# Patient Record
Sex: FEMALE | Race: WHITE | Employment: UNEMPLOYED | URBAN - METROPOLITAN AREA
[De-identification: names, ages, dates, MRNs, and addresses within clinical notes are randomized per-mention and may not be internally consistent; named-entity substitution may affect disease eponyms.]

---

## 2021-03-09 ENCOUNTER — TELEPHONE (OUTPATIENT)
Dept: FAMILY MEDICINE CLINIC | Age: 46
End: 2021-03-09

## 2021-03-09 ENCOUNTER — TRANSCRIBE ORDER (OUTPATIENT)
Dept: INTERNAL MEDICINE CLINIC | Age: 46
End: 2021-03-09

## 2021-03-11 ENCOUNTER — OFFICE VISIT (OUTPATIENT)
Dept: FAMILY MEDICINE CLINIC | Age: 46
End: 2021-03-11
Payer: MEDICAID

## 2021-03-11 VITALS
HEART RATE: 104 BPM | OXYGEN SATURATION: 95 % | DIASTOLIC BLOOD PRESSURE: 60 MMHG | BODY MASS INDEX: 17.73 KG/M2 | HEIGHT: 68 IN | RESPIRATION RATE: 16 BRPM | TEMPERATURE: 98.7 F | SYSTOLIC BLOOD PRESSURE: 100 MMHG | WEIGHT: 117 LBS

## 2021-03-11 DIAGNOSIS — R06.83 LOUD SNORING: ICD-10-CM

## 2021-03-11 DIAGNOSIS — N63.10 LUMP OF RIGHT BREAST: ICD-10-CM

## 2021-03-11 DIAGNOSIS — E10.65 TYPE 1 DIABETES MELLITUS WITH HYPERGLYCEMIA (HCC): Primary | ICD-10-CM

## 2021-03-11 DIAGNOSIS — F32.A ANXIETY AND DEPRESSION: ICD-10-CM

## 2021-03-11 DIAGNOSIS — F41.9 ANXIETY AND DEPRESSION: ICD-10-CM

## 2021-03-11 PROCEDURE — 99214 OFFICE O/P EST MOD 30 MIN: CPT | Performed by: FAMILY MEDICINE

## 2021-03-11 RX ORDER — DULOXETIN HYDROCHLORIDE 60 MG/1
60 CAPSULE, DELAYED RELEASE ORAL DAILY
Qty: 90 CAP | Refills: 1 | Status: SHIPPED | OUTPATIENT
Start: 2021-03-11 | End: 2021-11-11 | Stop reason: ALTCHOICE

## 2021-03-11 RX ORDER — HYDROXYZINE PAMOATE 25 MG/1
25 CAPSULE ORAL
Qty: 45 CAP | Refills: 0 | Status: SHIPPED | OUTPATIENT
Start: 2021-03-11 | End: 2021-03-25

## 2021-03-11 NOTE — PROGRESS NOTES
1. Have you been to the ER, urgent care clinic since your last visit? Hospitalized since your last visit? No    2. Have you seen or consulted any other health care providers outside of the [address] [place] [address] since your last visit? Include any pap smears or colon screening.  No

## 2021-03-11 NOTE — PROGRESS NOTES
Glenard Curling is a 50 y.o. female who presents to the office today with the following:  Chief Complaint   Patient presents with    Medication Evaluation     hydroxizine    Diabetes    Breast Mass     Right       HPI  DM 1  Last HbA1C 11.6  On Lantus  BS  in am  In pm 200-250, once 360  Has not seen dietician yet  Has virtual apt soon  On Lantus 27 U and 24 at night  Not taking fast acting after dinner  Taking 2 U of Lispro at breakfast and Lunch  Adjusted diet    Waiting to see Card  Waiting for records    Depression and Anxiety  On Cymbalta and Hydroxyzine   Needs refills  A little better with meds  Doing ok for right now      Right breast mass, had biopsy done 2 y ago  Bigger now, tender sometimes  Last Mammogram 2019  Drinks coffee and tea    Gained a few lbs    Tob abuse  Starting Chantix next Monday  Has not started yet    Snoring badly  No apnea    Review of Systems   Constitutional: Negative for weight loss. Neurological: Positive for dizziness. Psychiatric/Behavioral: Positive for depression. Negative for hallucinations and suicidal ideas. The patient is nervous/anxious and has insomnia. See HPI. Past Medical History:   Diagnosis Date    Allergies     CAD (coronary artery disease)     DM (diabetes mellitus), type 1, uncontrolled ([facility] [address]     HTN (hypertension)     Hyperlipidemia     Neuropathy     Renal stone        Past Surgical History:   Procedure Laterality Date    HX APPENDECTOMY      ruptured    HX CORONARY ARTERY BYPASS GRAFT  10/2019       No Known Allergies    Current Outpatient Medications   Medication Sig    hydrOXYzine pamoate (VISTARIL) 25 mg capsule Take 1 Cap by mouth three (3) times daily as needed for Anxiety for up to 14 days.  DULoxetine (CYMBALTA) 60 mg capsule Take 1 Cap by mouth daily.  spironolactone (ALDACTONE) 25 mg tablet Take  by mouth daily.     insulin glargine (LANTUS,BASAGLAR) 100 unit/mL (3 mL) inpn 27 units sc in am and 24 units at dinner    insulin lispro (Admelog SoloStar U-100 Insulin) 100 unit/mL kwikpen 4 units before meals, and per sliding scale when over 150, max 20 U a day    glucose blood VI test strips (Glucocard Expression) strip Test Glucose tid for DM E11.9    varenicline (CHANTIX STARTER FADIA) 0.5 mg (11)- 1 mg (42) DsPk Follow instructions    lisinopriL (PRINIVIL, ZESTRIL) 2.5 mg tablet Take 1 Tab by mouth daily.  gabapentin (NEURONTIN) 600 mg tablet Take 600 mg by mouth three (3) times daily.  atorvastatin (LIPITOR) 80 mg tablet Take 80 mg by mouth daily.  ascorbic acid, vitamin C, (Vitamin C) 250 mg tablet Take  by mouth.  naproxen sodium (Aleve) 220 mg cap Take  by mouth as needed.  multivitamin (ONE A DAY) tablet Take 1 Tab by mouth daily.  loratadine (CLARITIN) 10 mg tablet Take 10 mg by mouth daily.  flash glucose sensor (FreeStyle Messi 14 Day Sensor) kit Check Glucose qid for DM 1    flash glucose scanning reader (FreeStyle Messi 14 Day [place]) misc Check Glucose qid for DM 1     No current facility-administered medications for this visit. Social History     Socioeconomic History    Marital status: SINGLE     Spouse name: Not on file    Number of children: Not on file    Years of education: Not on file    Highest education level: Not on file   Tobacco Use    Smoking status: Current Every Day Smoker     Types: Cigarettes   Substance and Sexual Activity    Alcohol use: Never     Frequency: Never     Binge frequency: Never    Drug use: Yes     Types: Marijuana     Comment: for appetite       No family history on file. Physical Exam:  Visit Vitals  /60   Pulse (!) 104   Temp 98.7 °F (37.1 °C)   Resp 16   Ht 5' 8\" (1.727 m)   Wt 117 lb (53.1 kg)   SpO2 95%   BMI 17.79 kg/m²     Physical Exam  Vitals signs and nursing note reviewed. Constitutional:       Appearance: She is normal weight. HENT:      Head: Normocephalic and atraumatic.    Eyes:      Extraocular Movements: Extraocular movements intact. Conjunctiva/sclera: Conjunctivae normal.   Cardiovascular:      Rate and Rhythm: Normal rate and regular rhythm. Heart sounds: Normal heart sounds. Pulmonary:      Effort: Pulmonary effort is normal.      Breath sounds: Normal breath sounds. Comments: Right breast with about 1 cm lump at 11 o'clock position  Chest:       Lymphadenopathy:      Cervical: No cervical adenopathy. Skin:     General: Skin is warm and dry. Neurological:      Mental Status: She is alert and oriented to person, place, and time. Psychiatric:         Mood and Affect: Mood normal.         Behavior: Behavior normal.         Assessment/Plan:    ICD-10-CM ICD-9-CM    1. Type 1 diabetes mellitus with hyperglycemia (HCC)  E10.65 250.01 Increase Lispro to 3 U with Lunch and monitor BS   2. Lump of right breast  N63.10 611.72 JESUSITA 3D BIRGIT W MAMMO RT DX INCL CAD   3. Anxiety and depression  F41.9 300.00 hydrOXYzine pamoate (VISTARIL) 25 mg capsule    F32.9 311 DULoxetine (CYMBALTA) 60 mg capsule   4. Loud snoring  R06.83 786.09 REFERRAL TO SLEEP STUDIES       Follow-up and Dispositions    · Return in about 2 months (around 5/11/2021).        For Ben Dalton MD

## 2021-03-16 ENCOUNTER — TELEPHONE (OUTPATIENT)
Dept: FAMILY MEDICINE CLINIC | Age: 46
End: 2021-03-16

## 2021-03-16 NOTE — TELEPHONE ENCOUNTER
I have called and left a detailed message on this patient's personalized VMB. We got a message reporting that she did not show up for her apt for her diabetes with Lidna Ahn RD, CDE (dietician) at [facility]. I have asked the patient to call Nathalia Terrell at phone # [phone] and reschedule her apt. I have also asked the patient to call the office and let us know what she is planning on doing about this apt, whether she is going to do it or not.

## 2021-04-15 ENCOUNTER — TELEPHONE (OUTPATIENT)
Dept: FAMILY MEDICINE CLINIC | Age: 46
End: 2021-04-15

## 2021-04-15 NOTE — TELEPHONE ENCOUNTER
Patient has been to the ER twice now because her sugars keep dropping. She is asking for Dr. Cameron Corral to call her. Patient was offered an appointment but she would like to talk to Dr. Cameron Corral first prior to her coming in. Please call her.  [phone]

## 2021-04-16 ENCOUNTER — TELEPHONE (OUTPATIENT)
Dept: FAMILY MEDICINE CLINIC | Age: 46
End: 2021-04-16

## 2021-04-16 NOTE — TELEPHONE ENCOUNTER
I have called and left a detailed message on patient's personalized VMB. She needs an apt with Dr Sloane Yusuf to discuss her hypoglycemic episodes. Also, bring a diary of her BS readings from home.

## 2021-04-16 NOTE — TELEPHONE ENCOUNTER
I have called and left a detailed message on patient's personalized VMB. She needs an apt with Dr Gladis North to discuss her hypoglycemic episodes. Also, bring a diary of her BS readings from home.

## 2021-04-19 ENCOUNTER — OFFICE VISIT (OUTPATIENT)
Dept: FAMILY MEDICINE CLINIC | Age: 46
End: 2021-04-19
Payer: MEDICAID

## 2021-04-19 ENCOUNTER — TELEPHONE (OUTPATIENT)
Dept: FAMILY MEDICINE CLINIC | Age: 46
End: 2021-04-19

## 2021-04-19 VITALS
SYSTOLIC BLOOD PRESSURE: 106 MMHG | HEIGHT: 68 IN | OXYGEN SATURATION: 98 % | HEART RATE: 60 BPM | TEMPERATURE: 98 F | RESPIRATION RATE: 14 BRPM | WEIGHT: 116 LBS | BODY MASS INDEX: 17.58 KG/M2 | DIASTOLIC BLOOD PRESSURE: 60 MMHG

## 2021-04-19 DIAGNOSIS — G62.9 NEUROPATHY: ICD-10-CM

## 2021-04-19 DIAGNOSIS — E16.2 HYPOGLYCEMIA: ICD-10-CM

## 2021-04-19 DIAGNOSIS — Z13.31 POSITIVE DEPRESSION SCREENING: ICD-10-CM

## 2021-04-19 DIAGNOSIS — E10.65 TYPE 1 DIABETES MELLITUS WITH HYPERGLYCEMIA (HCC): Primary | ICD-10-CM

## 2021-04-19 PROCEDURE — 99213 OFFICE O/P EST LOW 20 MIN: CPT | Performed by: FAMILY MEDICINE

## 2021-04-19 RX ORDER — GABAPENTIN 600 MG/1
600 TABLET ORAL DAILY
Qty: 90 TAB | Refills: 2 | Status: SHIPPED | OUTPATIENT
Start: 2021-04-19 | End: 2021-11-11 | Stop reason: ALTCHOICE

## 2021-04-19 RX ORDER — FLASH GLUCOSE SCANNING READER
EACH MISCELLANEOUS
Qty: 1 EACH | Refills: 1 | Status: SHIPPED | OUTPATIENT
Start: 2021-04-19

## 2021-04-19 RX ORDER — FLASH GLUCOSE SENSOR
KIT MISCELLANEOUS
Qty: 1 KIT | Refills: 1 | Status: SHIPPED | OUTPATIENT
Start: 2021-04-19 | End: 2021-06-05

## 2021-04-19 RX ORDER — INSULIN LISPRO 100 [IU]/ML
INJECTION, SOLUTION INTRAVENOUS; SUBCUTANEOUS
Qty: 15 ML | Refills: 2 | Status: SHIPPED | OUTPATIENT
Start: 2021-04-19 | End: 2021-10-08

## 2021-04-19 RX ORDER — BLOOD-GLUCOSE CONTROL, NORMAL
EACH MISCELLANEOUS
Qty: 50 STRIP | Refills: 5 | Status: SHIPPED | OUTPATIENT
Start: 2021-04-19 | End: 2021-09-28 | Stop reason: SDUPTHER

## 2021-04-19 RX ORDER — INSULIN GLARGINE 100 [IU]/ML
INJECTION, SOLUTION SUBCUTANEOUS
Qty: 30 ML | Refills: 1 | Status: SHIPPED | OUTPATIENT
Start: 2021-04-19 | End: 2021-05-13 | Stop reason: SDUPTHER

## 2021-04-19 NOTE — TELEPHONE ENCOUNTER
I have called and left a message for this patient to return my call. She needs and apt with Dr Jeb Horton to discuss getting this Glucose Meter and her hypoglycemic episodes.

## 2021-04-19 NOTE — PROGRESS NOTES
1. Have you been to the ER, urgent care clinic since your last visit? Hospitalized since your last visit? No    2. Have you seen or consulted any other health care providers outside of the [address] [place] [address] since your last visit? Include any pap smears or colon screening.  No

## 2021-04-19 NOTE — TELEPHONE ENCOUNTER
I have called and left a message for this patient to return my call. She needs and apt with Dr Yudi Grissom to discuss getting this Glucose Meter and her hypoglycemic episodes.

## 2021-04-19 NOTE — PROGRESS NOTES
Praveen Jimenez is a 50 y.o. female who presents to the office today with the following:  Chief Complaint   Patient presents with    Diabetes     refills       HPI  DM  Sugar dropped into the 20's last week  [place] called ambulance and BS was in the 19's  Was confused  Came back to herself in ER after getting infusion of Glucose  Was told to take 20 U of Lantus  But still doing the same dose of 27 U in am and 24 U at night  -150 in am  BS at lunch around     Taking 4 U with 3 meals, breakfast Lunch and dinner  And occ according to sliding scale  Not sure what made it low    Has Endocrin apt this Fri  And has not yet set up the dietician apt, but can call for it    Going out more now    Neuropathy  Taking one Gabapentin a day now  And needs refills    Also for Insulin and the Messi system        ROS     See HPI. Past Medical History:   Diagnosis Date    Allergies     CAD (coronary artery disease)     DM (diabetes mellitus), type 1, uncontrolled ([address]     HTN (hypertension)     Hyperlipidemia     Neuropathy     Renal stone        Past Surgical History:   Procedure Laterality Date    HX APPENDECTOMY      ruptured    HX CORONARY ARTERY BYPASS GRAFT  10/2019       No Known Allergies    Current Outpatient Medications   Medication Sig    flash glucose scanning reader (FreeStyle Messi 14 Day [place]) misc Check Glucose qid for DM 1    flash glucose sensor (FreeStyle Messi 14 Day Sensor) kit Check Glucose qid for DM 1    insulin glargine (LANTUS,BASAGLAR) 100 unit/mL (3 mL) inpn 27 units sc in am and 24 units at dinner    insulin lispro (Admelog SoloStar U-100 Insulin) 100 unit/mL kwikpen 4 units before meals, and per sliding scale when over 150, max 20 U a day    glucose blood VI test strips (Glucocard Expression) strip Test Glucose tid for DM E11.9    gabapentin (NEURONTIN) 600 mg tablet Take 1 Tab by mouth daily.  Max Daily Amount: 600 mg.    DULoxetine (CYMBALTA) 60 mg capsule Take 1 Cap by mouth daily.  spironolactone (ALDACTONE) 25 mg tablet Take  by mouth daily.  lisinopriL (PRINIVIL, ZESTRIL) 2.5 mg tablet Take 1 Tab by mouth daily.  atorvastatin (LIPITOR) 80 mg tablet Take 80 mg by mouth daily.  loratadine (CLARITIN) 10 mg tablet Take 10 mg by mouth daily.  naproxen sodium (Aleve) 220 mg cap Take  by mouth as needed.  multivitamin (ONE A DAY) tablet Take 1 Tab by mouth daily.  varenicline (CHANTIX STARTER FADIA) 0.5 mg (11)- 1 mg (42) DsPk Follow instructions    ascorbic acid, vitamin C, (Vitamin C) 250 mg tablet Take  by mouth. No current facility-administered medications for this visit. Social History     Socioeconomic History    Marital status: SINGLE     Spouse name: Not on file    Number of children: Not on file    Years of education: Not on file    Highest education level: Not on file   Tobacco Use    Smoking status: Current Every Day Smoker     Types: Cigarettes   Substance and Sexual Activity    Alcohol use: Never     Frequency: Never     Binge frequency: Never    Drug use: Yes     Types: Marijuana     Comment: for appetite       No family history on file. Physical Exam:  Visit Vitals  /60 (BP 1 Location: Left upper arm)   Pulse 60   Temp 98 °F (36.7 °C) (Oral)   Resp 14   Ht 5' 8\" (1.727 m)   Wt 116 lb (52.6 kg)   SpO2 98%   BMI 17.64 kg/m²     Physical Exam  Vitals signs and nursing note reviewed. HENT:      Head: Normocephalic and atraumatic. Eyes:      Extraocular Movements: Extraocular movements intact. Conjunctiva/sclera: Conjunctivae normal.   Pulmonary:      Effort: Pulmonary effort is normal.   Neurological:      Mental Status: She is alert and oriented to person, place, and time. Psychiatric:         Mood and Affect: Mood normal.         Behavior: Behavior normal.         Assessment/Plan:    ICD-10-CM ICD-9-CM    1.  Type 1 diabetes mellitus with hyperglycemia (HCC)  E10.65 250.01 flash glucose scanning reader (FreeStyle Messi 14 Day [place]) misc      flash glucose sensor (FreeStyle Messi 14 Day Sensor) kit      insulin glargine (LANTUS,BASAGLAR) 100 unit/mL (3 mL) inpn      insulin lispro (Admelog SoloStar U-100 Insulin) 100 unit/mL kwikpen      glucose blood VI test strips (Glucocard Expression) strip   2. Hypoglycemia  E16.2 251.2    3. Neuropathy  G62.9 355.9 gabapentin (NEURONTIN) 600 mg tablet     Hold off with the sliding scale for now  F/U with Endocrin and set up apt with Dietician  Recheck BW in 1 mo      Follow-up and Dispositions    · Return in about 4 weeks (around 5/17/2021). Harmeet Meza MD  Depression screen positive, PHQ 9 Score: 18, C-SSRS completed.

## 2021-04-19 NOTE — PROGRESS NOTES
Non-Provider Advance Care Planning (ACP) Note    Date of ACP Conversation: 4/19/2021  Persons included in Conversation: patient  Length of ACP Conversation in minutes: <16 minutes (Non-Billable)    Conversation requested by:   Provider    Authorized Decision Maker (if patient is incapable of making informed decisions):    This person is:  Next of Kin by law (only applies in absence of a Healthcare Power of  or Legal Guardian)      Primary Decision Maker: Macho Bloom - [phone]    General ACP for ALL Patients with Decision Making Capacity:    Advance Directive Conversation with Patients who have not yet planned:  Importance of advance care planning, including choosing a healthcare agent to communicate patient's healthcare decisions if patient lost the ability to make decisions, such as after a sudden illness or accident    Review of Existing Advance Directive: (Select questions covered)  na    Interventions Provided:  Provided ACP educational materials:  Conversation Starter Kit  Advance Directive Form

## 2021-04-19 NOTE — TELEPHONE ENCOUNTER
[place] [address]             General Message/Vendor Calls     Caller's first and last name: Pt       Reason for call: Pt was wanting a monitor to check blood sugar in her arm rather than pricking her finger. Pt could not remember the name of it but it was a FreeStyle brand. Callback required yes/no and why: Yes; to discuss       Best contact number(s): [phone]       Details to clarify the request: Pt needed that to go to the [facility]; listed on her chart; did not know number.

## 2021-04-20 ENCOUNTER — TELEPHONE (OUTPATIENT)
Dept: FAMILY MEDICINE CLINIC | Age: 46
End: 2021-04-20

## 2021-05-13 ENCOUNTER — OFFICE VISIT (OUTPATIENT)
Dept: FAMILY MEDICINE CLINIC | Age: 46
End: 2021-05-13
Payer: MEDICAID

## 2021-05-13 VITALS
RESPIRATION RATE: 16 BRPM | BODY MASS INDEX: 18.67 KG/M2 | OXYGEN SATURATION: 93 % | DIASTOLIC BLOOD PRESSURE: 74 MMHG | HEIGHT: 68 IN | HEART RATE: 95 BPM | WEIGHT: 123.2 LBS | SYSTOLIC BLOOD PRESSURE: 121 MMHG | TEMPERATURE: 97.3 F

## 2021-05-13 DIAGNOSIS — R74.8 ELEVATED ALKALINE PHOSPHATASE LEVEL: ICD-10-CM

## 2021-05-13 DIAGNOSIS — E10.65 TYPE 1 DIABETES MELLITUS WITH HYPERGLYCEMIA (HCC): Primary | ICD-10-CM

## 2021-05-13 PROCEDURE — 99213 OFFICE O/P EST LOW 20 MIN: CPT | Performed by: FAMILY MEDICINE

## 2021-05-13 RX ORDER — HYDROXYZINE PAMOATE 25 MG/1
25 CAPSULE ORAL
COMMUNITY

## 2021-05-13 RX ORDER — INSULIN GLARGINE 100 [IU]/ML
INJECTION, SOLUTION SUBCUTANEOUS
Qty: 30 ML | Refills: 1
Start: 2021-05-13 | End: 2021-10-05

## 2021-05-13 NOTE — PROGRESS NOTES
1. Have you been to the ER, urgent care clinic since your last visit? Hospitalized since your last visit? No    2. Have you seen or consulted any other health care providers outside of the [address] [place] [address] since your last visit? Include any pap smears or colon screening.  Yes Where: Endocrinologist

## 2021-05-13 NOTE — PROGRESS NOTES
Naresh Slaughter is a 50 y.o. female who presents to the office today with the following:  Chief Complaint   Patient presents with    Diabetes     1 mo follow up       HPI  DM 1  Feeling much better  Has seen Endocrin  Taken off 27 U of Lantus in am and pm  Now only on 20 U of Lantus at Lunch only  And Humolog 4 U with meals  BS great  Usually  all day long  Highest 198  Not snacking all day  Has seed dietician and helpful    Nathan watkins was up last BW  Due for recheck    ROS  See HPI. Past Medical History:   Diagnosis Date    Allergies     CAD (coronary artery disease)     DM (diabetes mellitus), type 1, uncontrolled ([address]     HTN (hypertension)     Hyperlipidemia     Neuropathy     Renal stone        Past Surgical History:   Procedure Laterality Date    HX APPENDECTOMY      ruptured    HX CORONARY ARTERY BYPASS GRAFT  10/2019       No Known Allergies    Current Outpatient Medications   Medication Sig    hydrOXYzine pamoate (VISTARIL) 25 mg capsule Take 25 mg by mouth three (3) times daily as needed.  insulin glargine (LANTUS,BASAGLAR) 100 unit/mL (3 mL) inpn 20 units at noon    flash glucose scanning reader (FreeStyle Messi 14 Day [place]) misc Check Glucose qid for DM 1    flash glucose sensor (FreeStyle Messi 14 Day Sensor) kit Check Glucose qid for DM 1    insulin lispro (Admelog SoloStar U-100 Insulin) 100 unit/mL kwikpen 4 units before meals, and per sliding scale when over 150, max 20 U a day    glucose blood VI test strips (Glucocard Expression) strip Test Glucose tid for DM E11.9    gabapentin (NEURONTIN) 600 mg tablet Take 1 Tab by mouth daily. Max Daily Amount: 600 mg.    DULoxetine (CYMBALTA) 60 mg capsule Take 1 Cap by mouth daily.  spironolactone (ALDACTONE) 25 mg tablet Take  by mouth daily.  lisinopriL (PRINIVIL, ZESTRIL) 2.5 mg tablet Take 1 Tab by mouth daily.  atorvastatin (LIPITOR) 80 mg tablet Take 80 mg by mouth daily.     loratadine (CLARITIN) 10 mg tablet Take 10 mg by mouth daily.  ascorbic acid, vitamin C, (Vitamin C) 250 mg tablet Take  by mouth.  naproxen sodium (Aleve) 220 mg cap Take  by mouth as needed.  multivitamin (ONE A DAY) tablet Take 1 Tab by mouth daily. No current facility-administered medications for this visit. Social History     Socioeconomic History    Marital status: SINGLE     Spouse name: Not on file    Number of children: Not on file    Years of education: Not on file    Highest education level: Not on file   Tobacco Use    Smoking status: Current Every Day Smoker     Types: Cigarettes    Smokeless tobacco: Never Used   Substance and Sexual Activity    Alcohol use: Never     Frequency: Never     Binge frequency: Never    Drug use: Yes     Types: Marijuana     Comment: for appetite       No family history on file. Physical Exam:  Visit Vitals  /74 (BP 1 Location: Right arm, BP Patient Position: Sitting, BP Cuff Size: Adult)   Pulse 95   Temp 97.3 °F (36.3 °C) (Temporal)   Resp 16   Ht 5' 8\" (1.727 m)   Wt 123 lb 3.2 oz (55.9 kg)   SpO2 93%   BMI 18.73 kg/m²     Physical Exam  Vitals signs and nursing note reviewed. Constitutional:       Appearance: She is normal weight. HENT:      Head: Normocephalic and atraumatic. Eyes:      Extraocular Movements: Extraocular movements intact. Conjunctiva/sclera: Conjunctivae normal.   Cardiovascular:      Rate and Rhythm: Normal rate and regular rhythm. Heart sounds: Normal heart sounds. Pulmonary:      Effort: Pulmonary effort is normal.      Breath sounds: Normal breath sounds. Musculoskeletal:      Right lower leg: No edema. Left lower leg: No edema. Skin:     General: Skin is warm and dry. Neurological:      Mental Status: She is alert and oriented to person, place, and time. Psychiatric:         Mood and Affect: Mood normal.         Behavior: Behavior normal.         Assessment/Plan:    ICD-10-CM ICD-9-CM    1.  Type 1 diabetes mellitus with hyperglycemia (HCC)  E10.65 250.01 HEMOGLOBIN A1C WITH EAG      HEMOGLOBIN A1C WITH EAG      insulin glargine (LANTUS,BASAGLAR) 100 unit/mL (3 mL) inpn   2.  Elevated alkaline phosphatase level  W61.2 379.9 METABOLIC PANEL, COMPREHENSIVE      METABOLIC PANEL, COMPREHENSIVE           Eleazar Albright MD

## 2021-05-15 LAB
ALBUMIN SERPL-MCNC: 3.9 G/DL (ref 3.8–4.8)
ALBUMIN/GLOB SERPL: 1.8 {RATIO} (ref 1.2–2.2)
ALP SERPL-CCNC: 96 IU/L (ref 39–117)
ALT SERPL-CCNC: 13 IU/L (ref 0–32)
AST SERPL-CCNC: 22 IU/L (ref 0–40)
BILIRUB SERPL-MCNC: <0.2 MG/DL (ref 0–1.2)
BUN SERPL-MCNC: 20 MG/DL (ref 6–24)
BUN/CREAT SERPL: 23 (ref 9–23)
CALCIUM SERPL-MCNC: 10 MG/DL (ref 8.7–10.2)
CHLORIDE SERPL-SCNC: 102 MMOL/L (ref 96–106)
CO2 SERPL-SCNC: 27 MMOL/L (ref 20–29)
CREAT SERPL-MCNC: 0.87 MG/DL (ref 0.57–1)
EST. AVERAGE GLUCOSE BLD GHB EST-MCNC: 209 MG/DL
GLOBULIN SER CALC-MCNC: 2.2 G/DL (ref 1.5–4.5)
GLUCOSE SERPL-MCNC: 219 MG/DL (ref 65–99)
HBA1C MFR BLD: 8.9 % (ref 4.8–5.6)
POTASSIUM SERPL-SCNC: 5.1 MMOL/L (ref 3.5–5.2)
PROT SERPL-MCNC: 6.1 G/DL (ref 6–8.5)
SODIUM SERPL-SCNC: 140 MMOL/L (ref 134–144)

## 2021-05-15 NOTE — PROGRESS NOTES
Call pt, the ULL6N is 8.9 now  Certainly improved from last time  Cont current meds and recheck in 3 mo  The kidney and liver tests and electrolytes are normal  Also the Alk phos is normal now

## 2021-05-17 NOTE — PROGRESS NOTES
Spoke with patient after verifying Name, and , informed patient of lab results and recommendations per Dr. Myesha Manzano . Patient given an opportunity to ask questions, repeated information, and verbalized understanding.

## 2021-06-05 DIAGNOSIS — E10.65 TYPE 1 DIABETES MELLITUS WITH HYPERGLYCEMIA (HCC): ICD-10-CM

## 2021-06-05 RX ORDER — FLASH GLUCOSE SENSOR
KIT MISCELLANEOUS
Qty: 1 KIT | Refills: 1 | Status: SHIPPED | OUTPATIENT
Start: 2021-06-05 | End: 2021-07-14

## 2021-06-17 ENCOUNTER — TELEPHONE (OUTPATIENT)
Dept: FAMILY MEDICINE CLINIC | Age: 46
End: 2021-06-17

## 2021-06-17 NOTE — TELEPHONE ENCOUNTER
----- Message from Tim Echevarria sent at 6/17/2021  8:18 AM EDT -----  Regarding: Dr. Angelita Medrano  Contact: [phone]  General Message/Vendor Calls    Caller's first and last name: N/A      Reason for call: Cardiologist      Callback required yes/no and why: Yes/Confirm      Best contact number(s):[phone]      Details to clarify the request: Patient is looking for a name and phone number for a cardiologist.       Tim Echevarria

## 2021-07-01 ENCOUNTER — TELEPHONE (OUTPATIENT)
Dept: FAMILY MEDICINE CLINIC | Age: 46
End: 2021-07-01

## 2021-07-01 NOTE — TELEPHONE ENCOUNTER
Patient says she woke up this morning and her toe is purple and leg is swollen. She states she has no pain.   She can be reached at 26.1

## 2021-07-01 NOTE — LETTER
7/12/2021 2:01 PM    Ms. Patricia Davidson  [address]    Dear Ms. Ulysses Dirk;    I have called your number multiple times to try to talk to you. There is no answer and no Voice Mail Box set up to leave you a message. You called the office to talked about your toe. Please call the office back at  [phone] so we can talk about your toe issue. When you call back, please make sure we have a phone number that we can reach you at.           Sincerely,        Haja Fernandez RN  for  Tashi Garrison MD

## 2021-07-06 NOTE — TELEPHONE ENCOUNTER
I have attempted to contact this patient again. The cell # did not get an answer and no VMB is set up. I called the house phone number and talked to the patient's fiance, he has given me her correct cell # [phone]. I have called that number and got no answer and no VMB set up. I called the house number again and asked the fiance to have her call me back.

## 2021-07-12 NOTE — TELEPHONE ENCOUNTER
I have called and once again gotten a message that she is unavailable and no VMB is set up. I will be sending her a letter asking her to call the office.

## 2021-07-19 ENCOUNTER — TELEPHONE (OUTPATIENT)
Dept: FAMILY MEDICINE CLINIC | Age: 46
End: 2021-07-19

## 2021-07-19 NOTE — TELEPHONE ENCOUNTER
I have called and talked to this patient. I have given her Dr Theo Cunha, Cardiologist's name and his office phone # [phone].

## 2021-07-19 NOTE — TELEPHONE ENCOUNTER
Caller's first and last name: Cornel Click       Reason for call:  Requesting a call back to get contact information for cardiologist that was recommended.  Pt has misplaced it.        Callback required yes/no and why: yes       Best contact number(s):[phone]

## 2021-07-28 ENCOUNTER — OFFICE VISIT (OUTPATIENT)
Dept: CARDIOLOGY CLINIC | Age: 46
End: 2021-07-28
Payer: MEDICAID

## 2021-07-28 VITALS
WEIGHT: 117 LBS | OXYGEN SATURATION: 96 % | RESPIRATION RATE: 16 BRPM | SYSTOLIC BLOOD PRESSURE: 110 MMHG | TEMPERATURE: 98 F | DIASTOLIC BLOOD PRESSURE: 68 MMHG | HEIGHT: 68 IN | HEART RATE: 77 BPM | BODY MASS INDEX: 17.73 KG/M2

## 2021-07-28 DIAGNOSIS — I25.10 CORONARY ARTERY DISEASE INVOLVING NATIVE CORONARY ARTERY OF NATIVE HEART WITHOUT ANGINA PECTORIS: Primary | ICD-10-CM

## 2021-07-28 DIAGNOSIS — I10 ESSENTIAL HYPERTENSION: ICD-10-CM

## 2021-07-28 DIAGNOSIS — G62.9 NEUROPATHY: ICD-10-CM

## 2021-07-28 DIAGNOSIS — Z95.1 S/P CABG (CORONARY ARTERY BYPASS GRAFT): ICD-10-CM

## 2021-07-28 DIAGNOSIS — R07.9 CHEST PAIN SYNDROME: ICD-10-CM

## 2021-07-28 DIAGNOSIS — E10.65 UNCONTROLLED TYPE 1 DIABETES MELLITUS WITH HYPERGLYCEMIA (HCC): ICD-10-CM

## 2021-07-28 DIAGNOSIS — E78.2 MIXED HYPERLIPIDEMIA: ICD-10-CM

## 2021-07-28 PROCEDURE — 93000 ELECTROCARDIOGRAM COMPLETE: CPT | Performed by: INTERNAL MEDICINE

## 2021-07-28 PROCEDURE — 99204 OFFICE O/P NEW MOD 45 MIN: CPT | Performed by: INTERNAL MEDICINE

## 2021-07-28 PROCEDURE — 3052F HG A1C>EQUAL 8.0%<EQUAL 9.0%: CPT | Performed by: INTERNAL MEDICINE

## 2021-07-28 NOTE — PROGRESS NOTES
Gina Zhou is a 48 y.o. female is here to establish local cardiac care, referred by PCP. Hx ASCVD, CAD, s/p CABG x3 2019 ([facility]), IDDM, hypertension, dyslipidemia, tobacco abuse, diabetic neuropathy followed at [facility]. Issues with chronic chest pain post CABG. Recent OV and labs with PCP. Chest pain, more lying down/positional.  No exertional sx. Trying to quit smoking (down to 1/2 PPD, starting chantix, has tried patches previously). Physically active. Recent HbA1c 8.9. Lipids 2/21 with chol 175, LDL 96, HDL 55,   The patient denies shortness of breath, orthopnea, PND, LE edema, palpitations, syncope, presyncope or fatigue. Patient Active Problem List    Diagnosis Date Noted    CAD (coronary artery disease)     DM (diabetes mellitus), type 1, uncontrolled ([address]     HTN (hypertension)     Hyperlipidemia     Neuropathy     S/P CABG (coronary artery bypass graft) 2019      Nando Rodrigues MD  Past Medical History:   Diagnosis Date    Allergies     CAD (coronary artery disease)     DM (diabetes mellitus), type 1, uncontrolled ([address]     HTN (hypertension)     Hyperlipidemia     Neuropathy     Renal stone     S/P CABG (coronary artery bypass graft) 2019      Past Surgical History:   Procedure Laterality Date    HX APPENDECTOMY      ruptured    HX CORONARY ARTERY BYPASS GRAFT  10/2019     No Known Allergies   History reviewed. No pertinent family history.    Social History     Socioeconomic History    Marital status: SINGLE     Spouse name: Not on file    Number of children: Not on file    Years of education: Not on file    Highest education level: Not on file   Occupational History    Not on file   Tobacco Use    Smoking status: Current Every Day Smoker     Packs/day: 0.50     Types: Cigarettes    Smokeless tobacco: Never Used   Substance and Sexual Activity    Alcohol use: Never    Drug use: Yes     Types: Marijuana     Comment: for appetite  Sexual activity: Not on file   Other Topics Concern    Not on file   Social History Narrative    Not on file     Social Determinants of Health     Financial Resource Strain:     Difficulty of Paying Living Expenses:    Food Insecurity:     Worried About Running Out of Food in the Last Year:     [address] in the Last Year:    Transportation Needs:     Lack of Transportation (Medical):  Lack of Transportation (Non-Medical):    Physical Activity:     Days of Exercise per Week:     Minutes of Exercise per Session:    Stress:     Feeling of Stress :    Social Connections:     Frequency of Communication with Friends and Family:     Frequency of Social Gatherings with Friends and Family:     Attends Sabianist Services:     Active Member of Clubs or Organizations:     Attends Club or Organization Meetings:     Marital Status:    Intimate Partner Violence:     Fear of Current or Ex-Partner:     Emotionally Abused:     Physically Abused:     Sexually Abused:       Current Outpatient Medications   Medication Sig    FreeStyle Messi 2 Sensor kit use as directed for 14 days    hydrOXYzine pamoate (VISTARIL) 25 mg capsule Take 25 mg by mouth three (3) times daily as needed.  insulin glargine (LANTUS,BASAGLAR) 100 unit/mL (3 mL) inpn 20 units at noon    flash glucose scanning reader (FreeStyle Messi 14 Day [place]) misc Check Glucose qid for DM 1    insulin lispro (Admelog SoloStar U-100 Insulin) 100 unit/mL kwikpen 4 units before meals, and per sliding scale when over 150, max 20 U a day    glucose blood VI test strips (Glucocard Expression) strip Test Glucose tid for DM E11.9    gabapentin (NEURONTIN) 600 mg tablet Take 1 Tab by mouth daily. Max Daily Amount: 600 mg.    DULoxetine (CYMBALTA) 60 mg capsule Take 1 Cap by mouth daily.  spironolactone (ALDACTONE) 25 mg tablet Take  by mouth daily.  lisinopriL (PRINIVIL, ZESTRIL) 2.5 mg tablet Take 1 Tab by mouth daily.     atorvastatin (LIPITOR) 80 mg tablet Take 80 mg by mouth daily.  loratadine (CLARITIN) 10 mg tablet Take 10 mg by mouth daily.  ascorbic acid, vitamin C, (Vitamin C) 250 mg tablet Take  by mouth.  naproxen sodium (Aleve) 220 mg cap Take  by mouth as needed.  multivitamin (ONE A DAY) tablet Take 1 Tab by mouth daily. No current facility-administered medications for this visit. Review of Symptoms:    CONST  No weight change. No fever, chills, sweats    ENT No visual changes, URI sx, sore throat    CV  See HPI   RESP  No cough, or sputum, wheezing. Also see HPI   GI  No abdominal pain or change in bowel habits. No heartburn or dysphagia. No melena or rectal bleeding.   No dysuria, urgency, frequency, hematuria   MSKEL  No joint pain, swelling. No muscle pain. SKIN  No rash or lesions. NEURO  No headache, syncope, or seizure. No weakness, loss of sensation, or paresthesias. PSYCH  No low mood or depression  No anxiety. HE/LYMPH  No easy bruising, abnormal bleeding, or enlarged glands. Physical ExamPhysical Exam:    Visit Vitals  /68   Pulse 77   Temp 98 °F (36.7 °C) (Temporal)   Resp 16   Ht 5' 8\" (1.727 m)   Wt 117 lb (53.1 kg)   SpO2 96%   BMI 17.79 kg/m²     Gen: NAD  HEENT:  PERRL, throat clear  Neck: no adenopathy, no thyromegaly, no JVD   Heart:  Regular,Nl S1S2,  no murmur, gallop or rub. Lungs:  Clear healed sternotomy, ant chest wall tenderness  Abdomen:   Soft, non-tender, bowel sounds are active.    Extremities:  No edema  Pulse: symmetric  Neuro: A&O times 3, No focal neuro deficits    Cardiographics    ECG: NSR, RSR', PRWP/possible old ant MI, KENNY      Labs:   Lab Results   Component Value Date/Time    Sodium 140 05/14/2021 12:00 AM    Sodium 139 02/11/2021 12:00 AM    Potassium 5.1 05/14/2021 12:00 AM    Potassium 4.9 02/11/2021 12:00 AM    Chloride 102 05/14/2021 12:00 AM    Chloride 97 02/11/2021 12:00 AM    CO2 27 05/14/2021 12:00 AM    CO2 34 (H) 02/11/2021 12:00 AM    Glucose 219 (H) 05/14/2021 12:00 AM    Glucose 247 (H) 02/11/2021 12:00 AM    BUN 20 05/14/2021 12:00 AM    BUN 8 02/11/2021 12:00 AM    Creatinine 0.87 05/14/2021 12:00 AM    Creatinine 0.76 02/11/2021 12:00 AM    BUN/Creatinine ratio 23 05/14/2021 12:00 AM    BUN/Creatinine ratio 11 02/11/2021 12:00 AM    GFR est AA 92 05/14/2021 12:00 AM    GFR est  02/11/2021 12:00 AM    GFR est non-AA 80 05/14/2021 12:00 AM    GFR est non-AA 94 02/11/2021 12:00 AM    Calcium 10.0 05/14/2021 12:00 AM    Calcium 9.8 02/11/2021 12:00 AM    Bilirubin, total <0.2 05/14/2021 12:00 AM    Bilirubin, total <0.2 02/11/2021 12:00 AM    Alk. phosphatase 96 05/14/2021 12:00 AM    Alk. phosphatase 229 (H) 02/11/2021 12:00 AM    Protein, total 6.1 05/14/2021 12:00 AM    Protein, total 6.9 02/11/2021 12:00 AM    Albumin 3.9 05/14/2021 12:00 AM    Albumin 3.8 02/11/2021 12:00 AM    A-G Ratio 1.8 05/14/2021 12:00 AM    A-G Ratio 1.2 02/11/2021 12:00 AM    ALT (SGPT) 13 05/14/2021 12:00 AM    ALT (SGPT) 27 02/11/2021 12:00 AM     No results found for: CPK, CPKX, CPX  Lab Results   Component Value Date/Time    Cholesterol, total 175 02/11/2021 12:00 AM    HDL Cholesterol 55 02/11/2021 12:00 AM    LDL, calculated 96 02/11/2021 12:00 AM    Triglyceride 135 02/11/2021 12:00 AM     No results found for this or any previous visit. Assessment:         Patient Active Problem List    Diagnosis Date Noted    CAD (coronary artery disease)     DM (di[facility] mellitus), type 1, uncontrolled [facility]     HTN (hypertension)     Hyperlipidemia     Neuropathy     S/P CABG (coronary artery bypass graft) 2019     48 y.o. female is here to establish local cardiac care, referred by PCP. Hx ASCVD, CAD, s/p CABG 2019 ([facility]), IDDM, hypertension, dyslipidemia, tobacco abuse, diabetic neuropathy followed at [facility]. Issues with chronic chest pain post CABG. Recent OV and labs with PCP.   Chest pain, more lying down/positional.  No exertional sx. Trying to quit smoking (down to 1/2 PPD, starting chantix, has tried patches previously). Physically active. Recent HbA1c 8.9.   Lipids 2/21 with chol 175, LDL 96, HDL 55,        Plan:     Persistent chest wall pain post sternotomy  Echo/doppler--call w/ results--CP, CAD, post CABG, evaluate LVEF  Continue DM meds--optimize control  Continue low dose ACEI  Continue atorvastatin 80, consider adding zetia (hold off for now)  Low dose ASA 81  neurontin/cymbalta for neuropathy  Smoking cessation discussed  Outside records when available    Mian Mcconnell MD

## 2021-07-28 NOTE — PROGRESS NOTES
Identified pt with two pt identifiers(name and ). Reviewed record in preparation for visit and have obtained necessary documentation. Chief Complaint   Patient presents with    Coronary Artery Disease    Hypertension      Vitals:    21 1300   BP: 110/68   Pulse: 77   Resp: 16   Temp: 98 °F (36.7 °C)   TempSrc: Temporal   SpO2: 96%   Weight: 117 lb (53.1 kg)   Height: 5' 8\" (1.727 m)   PainSc:   5   PainLoc: Chest       Health Maintenance Review: Patient reminded of \"due or due soon\" health maintenance. I have asked the patient to contact his/her primary care provider (PCP) for follow-up on his/her health maintenance. Coordination of Care Questionnaire:  :   1) Have you been to an emergency room, urgent care, or hospitalized since your last visit? If yes, where when, and reason for visit? no       2. Have seen or consulted any other health care provider since your last visit? If yes, where when, and reason for visit? NO      Patient is accompanied by self I have received verbal consent from Nena Tee to discuss any/all medical information while they are present in the room.

## 2021-08-06 ENCOUNTER — HOSPITAL ENCOUNTER (OUTPATIENT)
Dept: ULTRASOUND IMAGING | Age: 46
Discharge: HOME | End: 2021-08-06
Attending: INTERNAL MEDICINE
Payer: MEDICAID

## 2021-08-06 DIAGNOSIS — I10 ESSENTIAL HYPERTENSION: ICD-10-CM

## 2021-08-06 DIAGNOSIS — Z95.1 S/P CABG (CORONARY ARTERY BYPASS GRAFT): ICD-10-CM

## 2021-08-06 DIAGNOSIS — E10.65 UNCONTROLLED TYPE 1 DIABETES MELLITUS WITH HYPERGLYCEMIA (HCC): ICD-10-CM

## 2021-08-06 DIAGNOSIS — I25.10 CORONARY ARTERY DISEASE INVOLVING NATIVE CORONARY ARTERY OF NATIVE HEART WITHOUT ANGINA PECTORIS: ICD-10-CM

## 2021-08-06 DIAGNOSIS — R07.9 CHEST PAIN SYNDROME: ICD-10-CM

## 2021-08-06 LAB
ECHO AO ROOT DIAM: 2.87 CM
ECHO AV PEAK GRADIENT: 4.78 MMHG
ECHO AV PEAK VELOCITY: 109.35 CM/S
ECHO EST RA PRESSURE: 10 MMHG
ECHO LA MAJOR AXIS: 2.36 CM
ECHO LV E' SEPTAL VELOCITY: 12.75 CM/S
ECHO LV INTERNAL DIMENSION DIASTOLIC: 4.21 CM (ref 3.9–5.3)
ECHO LV INTERNAL DIMENSION SYSTOLIC: 2.85 CM
ECHO LV IVSD: 0.95 CM (ref 0.6–0.9)
ECHO LV MASS 2D: 120.9 G (ref 67–162)
ECHO LV POSTERIOR WALL DIASTOLIC: 0.87 CM (ref 0.6–0.9)
ECHO LVOT DIAM: 1.97 CM
ECHO MV A VELOCITY: 56.33 CM/S
ECHO MV E DECELERATION TIME (DT): 202.71 MS
ECHO MV E VELOCITY: 74.24 CM/S
ECHO MV E/A RATIO: 1.32
ECHO MV E/E' SEPTAL: 5.82
ECHO RIGHT VENTRICULAR SYSTOLIC PRESSURE (RVSP): 28.94 MMHG
ECHO TV REGURGITANT MAX VELOCITY: 217.37 CM/S
ECHO TV REGURGITANT PEAK GRADIENT: 18.94 MMHG

## 2021-08-06 PROCEDURE — 93306 TTE W/DOPPLER COMPLETE: CPT | Performed by: INTERNAL MEDICINE

## 2021-08-06 PROCEDURE — 93306 TTE W/DOPPLER COMPLETE: CPT

## 2021-08-11 ENCOUNTER — TELEPHONE (OUTPATIENT)
Dept: CARDIOLOGY CLINIC | Age: 46
End: 2021-08-11

## 2021-08-11 NOTE — TELEPHONE ENCOUNTER
echo  Received: 5 days ago  MD Boston Louis LPN  Advise echo normal.  RTC 6 mos, sooner prn. Dolores Patel     Spoke with the patient. Verified patient with two patient identifiers. Results given and questions answered. Patient verbalized understanding.

## 2021-09-24 ENCOUNTER — TELEPHONE (OUTPATIENT)
Dept: FAMILY MEDICINE CLINIC | Age: 46
End: 2021-09-24

## 2021-09-28 DIAGNOSIS — E10.65 TYPE 1 DIABETES MELLITUS WITH HYPERGLYCEMIA (HCC): ICD-10-CM

## 2021-09-28 RX ORDER — BLOOD-GLUCOSE CONTROL, NORMAL
EACH MISCELLANEOUS
Qty: 50 STRIP | Refills: 5 | Status: SHIPPED | OUTPATIENT
Start: 2021-09-28 | End: 2022-01-25 | Stop reason: SDUPTHER

## 2021-10-05 DIAGNOSIS — E10.65 TYPE 1 DIABETES MELLITUS WITH HYPERGLYCEMIA (HCC): ICD-10-CM

## 2021-10-05 RX ORDER — INSULIN GLARGINE 100 [IU]/ML
INJECTION, SOLUTION SUBCUTANEOUS
Qty: 15 ML | Refills: 0 | Status: SHIPPED | OUTPATIENT
Start: 2021-10-05 | End: 2022-01-25 | Stop reason: SDUPTHER

## 2021-10-08 ENCOUNTER — TELEPHONE (OUTPATIENT)
Dept: FAMILY MEDICINE CLINIC | Age: 46
End: 2021-10-08

## 2021-10-08 DIAGNOSIS — E10.65 TYPE 1 DIABETES MELLITUS WITH HYPERGLYCEMIA (HCC): Primary | ICD-10-CM

## 2021-10-08 RX ORDER — INSULIN ASPART 100 [IU]/ML
INJECTION, SOLUTION INTRAVENOUS; SUBCUTANEOUS
Qty: 15 ML | Refills: 3 | Status: SHIPPED | OUTPATIENT
Start: 2021-10-08 | End: 2021-11-11 | Stop reason: ALTCHOICE

## 2021-10-08 RX ORDER — INSULIN ASPART 100 [IU]/ML
INJECTION, SUSPENSION SUBCUTANEOUS
Qty: 15 ML | Refills: 3 | Status: SHIPPED | OUTPATIENT
Start: 2021-10-08 | End: 2021-10-08 | Stop reason: CLARIF

## 2021-10-08 NOTE — TELEPHONE ENCOUNTER
The pharmacy claim for HumaLOG KwikPen 100UNIT/ML pen-injectors has been rejected by insurance. Non-preferred medications may have a higher patient co-pay than the health insurance plan's preferred medications. Using available formulary data, we believe that the following medications may be covered. Drug Name  PA Requirement  NovoLOG FlexPen Tier 1 NOT Required*  NovoLOG Tier 1  NOT Required*  NovoLOG PenFill Tier 1 NOT Required*  Levemir FlexTouch Tier 1 NOT Required*  NovoLOG Mix 70/30 FlexPen Tier 1 NOT Required*  HumaLOG Mix 75/25 KwikPen Tier 1 NOT Required*  HumuLIN R U-500 KwikPen Tier 1 NOT Required*  NovoLOG Mix 70/30 Tier 1 NOT Required*  HumuLIN R U-500 (CONCENTRATED) Tier 1    NOT Required*  HumaLOG Mix 50/50 KwikPen Tier 1 NOT Required*    Admelog SoloStar Tier 2, ST Required*  Admelog Tier 2, ST  Required*  Fiasp FlexTouch Tier 2, ST Required*  [place] FlexTouch Tier 2, ST Required*  Apidra SoloStar Tier 2, ST Required*  Fiasp Tier 2, ST  Required*  Lyumjev KwikPen Tier 2, ST Required*  Insulin Aspart FlexPen Tier 2 Required*  Insulin Lispro Ruslan KwikPen Tier 2    Required*  Insulin Aspart Tier 2  Required*  Insulin Asp Prot & Asp FlexPen Tier 2    Required*  Insulin Lispro Prot & Lispro Tier 2    Required*  Insulin Aspart Prot & Aspart Tier 2 Required*  Insulin Aspart PenFill Tier 2 Required*      Please review patient's chart and see if any of the formulary medications will work for the patient.

## 2021-10-11 ENCOUNTER — TELEPHONE (OUTPATIENT)
Dept: FAMILY MEDICINE CLINIC | Age: 46
End: 2021-10-11

## 2021-10-11 DIAGNOSIS — E10.65 TYPE 1 DIABETES MELLITUS WITH HYPERGLYCEMIA (HCC): Primary | ICD-10-CM

## 2021-10-11 NOTE — TELEPHONE ENCOUNTER
I spoke with Kaity about this referral. Pt states she wanted to see endocrinologist in [facility]. I let her know that the closest endocrinologist is [place] and is the provider she is already seeing. Pt does not want to continue with her. I suggested that to quickest way for pt to be seen is to see if there is another endocrinologist in the [facility] group that she can set up a telephone encounter with. Pt agrees to do that.

## 2021-10-11 NOTE — TELEPHONE ENCOUNTER
Patient asking for referral to a new endocrinologist. She is not happy with the one she has now. Please call her.  [phone]

## 2021-10-12 DIAGNOSIS — E10.65 TYPE 1 DIABETES MELLITUS WITH HYPERGLYCEMIA (HCC): ICD-10-CM

## 2021-10-12 RX ORDER — FLASH GLUCOSE SENSOR
KIT MISCELLANEOUS
Qty: 1 KIT | Refills: 5 | Status: SHIPPED | OUTPATIENT
Start: 2021-10-12 | End: 2022-01-08

## 2021-10-18 ENCOUNTER — OFFICE VISIT (OUTPATIENT)
Dept: FAMILY MEDICINE CLINIC | Age: 46
End: 2021-10-18
Payer: MEDICAID

## 2021-10-18 VITALS
HEART RATE: 108 BPM | SYSTOLIC BLOOD PRESSURE: 170 MMHG | BODY MASS INDEX: 17.9 KG/M2 | RESPIRATION RATE: 16 BRPM | OXYGEN SATURATION: 97 % | HEIGHT: 68 IN | WEIGHT: 118.13 LBS | TEMPERATURE: 97.3 F | DIASTOLIC BLOOD PRESSURE: 97 MMHG

## 2021-10-18 DIAGNOSIS — E10.65 TYPE 1 DIABETES MELLITUS WITH HYPERGLYCEMIA (HCC): Primary | ICD-10-CM

## 2021-10-18 DIAGNOSIS — B35.3 TINEA PEDIS OF BOTH FEET: ICD-10-CM

## 2021-10-18 DIAGNOSIS — R80.9 MICROALBUMINURIA: ICD-10-CM

## 2021-10-18 DIAGNOSIS — Z23 ENCOUNTER FOR IMMUNIZATION: ICD-10-CM

## 2021-10-18 DIAGNOSIS — L03.032 CELLULITIS OF TOE OF LEFT FOOT: ICD-10-CM

## 2021-10-18 DIAGNOSIS — B35.1 ONYCHOMYCOSIS: ICD-10-CM

## 2021-10-18 PROCEDURE — 90471 IMMUNIZATION ADMIN: CPT

## 2021-10-18 PROCEDURE — 90686 IIV4 VACC NO PRSV 0.5 ML IM: CPT

## 2021-10-18 PROCEDURE — 99214 OFFICE O/P EST MOD 30 MIN: CPT | Performed by: FAMILY MEDICINE

## 2021-10-18 PROCEDURE — 3052F HG A1C>EQUAL 8.0%<EQUAL 9.0%: CPT | Performed by: FAMILY MEDICINE

## 2021-10-18 RX ORDER — CHLORPHENIRAMINE MALEATE 4 MG
TABLET ORAL 2 TIMES DAILY
Qty: 15 G | Refills: 0 | Status: SHIPPED | OUTPATIENT
Start: 2021-10-18 | End: 2022-02-17 | Stop reason: ALTCHOICE

## 2021-10-18 RX ORDER — CEPHALEXIN 500 MG/1
500 CAPSULE ORAL 4 TIMES DAILY
Qty: 40 CAPSULE | Refills: 0 | Status: SHIPPED | OUTPATIENT
Start: 2021-10-18 | End: 2021-10-28

## 2021-10-18 NOTE — PROGRESS NOTES
1. Have you been to the ER, urgent care clinic since your last visit? Hospitalized since your last visit? No    2. Have you seen or consulted any other health care providers outside of the [address] [place] [address] since your last visit? Include any pap smears or colon screening.   Yes - 7/28/21 Dr Mark Vega (Cardiology)

## 2021-10-18 NOTE — PROGRESS NOTES
Abhijeet Olivares is a 48 y.o. female who presents to the office today with the following:  Chief Complaint   Patient presents with    Diabetes    Depression    Foot Pain     Left     Nail Problem     Left Great Toe        HPI  DM 1  Taking 20 U Lantus at noon  And taking SSI 4 U  In am BS   At dinner 175-201  Not eating regularly    Last urine with microalbumine  Pt on Lisinopril  But not taking any of her other meds    Depression  Not taking any meds    Drinking too much coffee    Sees Dr Ambrosio Joe    Toe fungus  Not feeling feet very well  Got blisters on right great toe and redness and swelling  And nails thick  Not seeing Podiatrist    ROS  See HPI. Past Medical History:   Diagnosis Date    Allergies     CAD (coronary artery disease)     Depression 2013    [facility]      DM (diabetes mellitus), type 1, uncontrolled [facility]     HTN (hypertension)     Hyperlipidemia     Neuropathy     Renal stone     S/P CABG (coronary artery bypass graft) 2019       Past Surgical History:   Procedure Laterality Date    HX APPENDECTOMY      ruptured    HX CORONARY ARTERY BYPASS GRAFT  10/2019       No Known Allergies    Current Outpatient Medications   Medication Sig    cephALEXin (KEFLEX) 500 mg capsule Take 1 Capsule by mouth four (4) times daily for 10 days.  clotrimazole (LOTRIMIN) 1 % topical cream Apply  to affected area two (2) times a day.  FreeStyle Messi 2 Sensor kit use as directed for 14 days    insulin aspart U-100 (NOVOLOG) 100 unit/mL (3 mL) inpn Inject 4 U prior to meals and on a sliding scale if Glucose is over 150, max 20 U a day    glucose blood VI test strips (Glucocard Expression) strip Test Glucose tid for DM E11.9    OTHER 50 Each. Indications: Freestyle Light Test Strips    flash glucose scanning reader (FreeStyle Messi 14 Day [place]) misc Check Glucose qid for DM 1    multivitamin (ONE A DAY) tablet Take 1 Tab by mouth daily.     insulin glargine (Lantus Solostar U-100 Insulin) 100 unit/mL (3 mL) inpn inject 27 units subcutaneously every morning and 24 units subcutaneously AT DINNER (Patient not taking: Reported on 10/18/2021)    hydrOXYzine pamoate (VISTARIL) 25 mg capsule Take 25 mg by mouth three (3) times daily as needed. (Patient not taking: Reported on 10/18/2021)    gabapentin (NEURONTIN) 600 mg tablet Take 1 Tab by mouth daily. Max Daily Amount: 600 mg. (Patient not taking: Reported on 10/18/2021)    DULoxetine (CYMBALTA) 60 mg capsule Take 1 Cap by mouth daily. (Patient not taking: Reported on 10/18/2021)    spironolactone (ALDACTONE) 25 mg tablet Take  by mouth daily. (Patient not taking: Reported on 10/18/2021)    lisinopriL (PRINIVIL, ZESTRIL) 2.5 mg tablet Take 1 Tab by mouth daily. (Patient not taking: Reported on 10/18/2021)    atorvastatin (LIPITOR) 80 mg tablet Take 80 mg by mouth daily. (Patient not taking: Reported on 10/18/2021)    loratadine (CLARITIN) 10 mg tablet Take 10 mg by mouth daily. (Patient not taking: Reported on 10/18/2021)    ascorbic acid, vitamin C, (Vitamin C) 250 mg tablet Take  by mouth. (Patient not taking: Reported on 10/18/2021)    naproxen sodium (Aleve) 220 mg cap Take  by mouth as needed. (Patient not taking: Reported on 10/18/2021)     No current facility-administered medications for this visit.        Social History     Socioeconomic History    Marital status: SINGLE     Spouse name: Not on file    Number of children: Not on file    Years of education: Not on file    Highest education level: Not on file   Tobacco Use    Smoking status: Current Every Day Smoker     Packs/day: 0.50     Types: Cigarettes    Smokeless tobacco: Never Used   Vaping Use    Vaping Use: Some days    Substances: CBD, Flavoring    Devices: Disposable   Substance and Sexual Activity    Alcohol use: Never    Drug use: Yes     Types: Marijuana     Comment: for appetite     Social Determinants of Health     Financial Resource Strain:     Difficulty of Paying Living Expenses:    Food Insecurity:     Worried About [address] in the Last Year:     [address] in the Last Year:    Transportation Needs:     Lack of Transportation (Medical):  Lack of Transportation (Non-Medical):    Physical Activity:     Days of Exercise per Week:     Minutes of Exercise per Session:    Stress:     Feeling of Stress :    Social Connections:     Frequency of Communication with Friends and Family:     Frequency of Social Gatherings with Friends and Family:     Attends Mormonism Services:     Active Member of Clubs or Organizations:     Attends Club or Organization Meetings:     Marital Status:        Family History   Problem Relation Age of Onset    Diabetes Maternal Grandmother     Heart Disease Maternal Grandmother         pacemaker         Physical Exam:  Visit Vitals  BP (!) 170/97 (BP 1 Location: Left upper arm, BP Patient Position: Sitting, BP Cuff Size: Adult)   Pulse (!) 108   Temp 97.3 °F (36.3 °C) (Oral)   Resp 16   Ht 5' 8\" (1.727 m)   Wt 118 lb 2 oz (53.6 kg)   SpO2 97%   BMI 17.96 kg/m²     Physical Exam  Vitals and nursing note reviewed. Constitutional:       Appearance: She is normal weight. HENT:      Head: Normocephalic and atraumatic. Eyes:      Extraocular Movements: Extraocular movements intact. Conjunctiva/sclera: Conjunctivae normal.   Cardiovascular:      Rate and Rhythm: Normal rate. Heart sounds: Normal heart sounds. Pulmonary:      Effort: Pulmonary effort is normal.      Breath sounds: Normal breath sounds. Musculoskeletal:      Right lower leg: No edema. Left lower leg: No edema. Skin:     Comments: Thick yellow nails, excoriations in between toes, left great toe with hematoma, blisters and redness and some swelling   Neurological:      Mental Status: She is alert and oriented to person, place, and time.    Psychiatric:         Mood and Affect: Mood normal.         Behavior: Behavior normal. Assessment/Plan:    ICD-10-CM ICD-9-CM    1. Type 1 diabetes mellitus with hyperglycemia (HCC)  E10.65 250.01 HEMOGLOBIN A1C WITH EAG       DIABETES FOOT EXAM      HEMOGLOBIN A1C WITH EAG      REFERRAL TO PODIATRY   2. Microalbuminuria  L73.1 142.5 METABOLIC PANEL, COMPREHENSIVE      METABOLIC PANEL, COMPREHENSIVE   3. Encounter for immunization  Z23 V03.89 INFLUENZA VIRUS VAC QUAD,SPLIT,PRESV FREE SYRINGE IM   4. Cellulitis of toe of left foot  L03.032 681.10 cephALEXin (KEFLEX) 500 mg capsule   5. Onychomycosis  B35.1 110.1 clotrimazole (LOTRIMIN) 1 % topical cream   6. Tinea pedis of both feet  B35.3 110.4 clotrimazole (LOTRIMIN) 1 % topical cream     Decrease Lantus to 17 U and try 5 U with meals  Recheck in 2 w with diary and meds  Go to ER if redness and swelling in toes worse    Follow-up and Dispositions    · Return in about 2 weeks (around 11/1/2021).          Gemma Martino MD

## 2021-10-19 LAB
ALBUMIN SERPL-MCNC: 4.4 G/DL (ref 3.8–4.8)
ALBUMIN/GLOB SERPL: 1.7 {RATIO} (ref 1.2–2.2)
ALP SERPL-CCNC: 103 IU/L (ref 44–121)
ALT SERPL-CCNC: 14 IU/L (ref 0–32)
AST SERPL-CCNC: 22 IU/L (ref 0–40)
BILIRUB SERPL-MCNC: 0.3 MG/DL (ref 0–1.2)
BUN SERPL-MCNC: 16 MG/DL (ref 6–24)
BUN/CREAT SERPL: 25 (ref 9–23)
CALCIUM SERPL-MCNC: 10.3 MG/DL (ref 8.7–10.2)
CHLORIDE SERPL-SCNC: 104 MMOL/L (ref 96–106)
CO2 SERPL-SCNC: 27 MMOL/L (ref 20–29)
CREAT SERPL-MCNC: 0.64 MG/DL (ref 0.57–1)
GLOBULIN SER CALC-MCNC: 2.6 G/DL (ref 1.5–4.5)
GLUCOSE SERPL-MCNC: 72 MG/DL (ref 65–99)
POTASSIUM SERPL-SCNC: 4.7 MMOL/L (ref 3.5–5.2)
PROT SERPL-MCNC: 7 G/DL (ref 6–8.5)
SODIUM SERPL-SCNC: 144 MMOL/L (ref 134–144)

## 2021-10-20 LAB
EST. AVERAGE GLUCOSE BLD GHB EST-MCNC: 186 MG/DL
HBA1C MFR BLD: 8.1 % (ref 4.8–5.6)

## 2021-10-21 NOTE — PROGRESS NOTES
Call pt, the HbA1C is 8.1, a little better, still high  Change Insulin as discussed and RTC with your diary in 2 w for further adjustment if needed

## 2021-10-22 NOTE — PROGRESS NOTES
Spoke with patient after verifying Name, and , informed patient of lab results and recommendations per Dr. Zulema Fox . Patient given an opportunity to ask questions, repeated information, and verbalized understanding.  Appointment scheduled for follow up

## 2021-10-27 ENCOUNTER — TELEPHONE (OUTPATIENT)
Dept: CARDIOLOGY CLINIC | Age: 46
End: 2021-10-27

## 2021-10-27 NOTE — TELEPHONE ENCOUNTER
Spoke with the patient. Verified patient with two patient identifiers. Pt reports a knot on her chest from the previous cabg procedure (2019). NO knots in the leg. Experiencing chest heaviness. Pt cannot wear shoes due to extreme leg swelling. Seeing foot doctor tomorrow. Seeing pcp 11/1. Strongly advised ER. Pt refused. [facility] partner appt in [place]. Pt denied. Scheduled her with MD Toshia Valdovinos on 11/11. Pt is aware of the possible damage of the heart muscle that can be caused by waiting if this is cardiac and she is not evaluated sooner. Questions answered. Patient verbalized understanding.

## 2021-11-08 ENCOUNTER — TELEPHONE (OUTPATIENT)
Dept: FAMILY MEDICINE CLINIC | Age: 46
End: 2021-11-08

## 2021-11-11 ENCOUNTER — OFFICE VISIT (OUTPATIENT)
Dept: CARDIOLOGY CLINIC | Age: 46
End: 2021-11-11
Payer: MEDICAID

## 2021-11-11 VITALS
HEART RATE: 72 BPM | TEMPERATURE: 97.2 F | DIASTOLIC BLOOD PRESSURE: 78 MMHG | HEIGHT: 68 IN | RESPIRATION RATE: 18 BRPM | BODY MASS INDEX: 17.58 KG/M2 | WEIGHT: 116 LBS | SYSTOLIC BLOOD PRESSURE: 120 MMHG | OXYGEN SATURATION: 98 %

## 2021-11-11 DIAGNOSIS — Z95.1 S/P CABG (CORONARY ARTERY BYPASS GRAFT): ICD-10-CM

## 2021-11-11 DIAGNOSIS — I10 PRIMARY HYPERTENSION: ICD-10-CM

## 2021-11-11 DIAGNOSIS — R80.9 MICROPROTEINURIA: ICD-10-CM

## 2021-11-11 DIAGNOSIS — E78.2 MIXED HYPERLIPIDEMIA: ICD-10-CM

## 2021-11-11 DIAGNOSIS — R07.89 OTHER CHEST PAIN: Primary | ICD-10-CM

## 2021-11-11 DIAGNOSIS — I25.10 CORONARY ARTERY DISEASE INVOLVING NATIVE CORONARY ARTERY OF NATIVE HEART WITHOUT ANGINA PECTORIS: ICD-10-CM

## 2021-11-11 PROCEDURE — 99214 OFFICE O/P EST MOD 30 MIN: CPT | Performed by: SPECIALIST

## 2021-11-11 RX ORDER — ATORVASTATIN CALCIUM 80 MG/1
80 TABLET, FILM COATED ORAL DAILY
Qty: 30 TABLET | Refills: 5 | Status: SHIPPED | OUTPATIENT
Start: 2021-11-11 | End: 2022-01-25 | Stop reason: SDUPTHER

## 2021-11-11 RX ORDER — LISINOPRIL 2.5 MG/1
2.5 TABLET ORAL DAILY
Qty: 30 TABLET | Refills: 5 | Status: SHIPPED | OUTPATIENT
Start: 2021-11-11 | End: 2022-01-25

## 2021-11-11 NOTE — PROGRESS NOTES
HISTORY OF PRESENT ILLNESS  Kaity Marcos is a 48 y.o. female     SUMMARY:   Problem List  Date Reviewed: 11/11/2021          Codes Class Noted    CAD (coronary artery disease) ICD-10-CM: I25.10  ICD-9-CM: 414.00  Unknown        DM (diabetes mellitus), type 1, uncontrolled ([facility] ICD-10-CM: E10.65  ICD-9-CM: 250.03  Unknown        HTN (hypertension) ICD-10-CM: I10  ICD-9-CM: 401.9  Unknown        Hyperlipidemia ICD-10-CM: E78.5  ICD-9-CM: 272.4  Unknown        Neuropathy ICD-10-CM: G62.9  ICD-9-CM: 355.9  Unknown        S/P CABG (coronary artery bypass graft) ICD-10-CM: Z95.1  ICD-9-CM: V45.81  2019              Current Outpatient Medications on File Prior to Visit   Medication Sig    clotrimazole (LOTRIMIN) 1 % topical cream Apply  to affected area two (2) times a day.  FreeStyle Messi 2 Sensor kit use as directed for 14 days    insulin glargine (Lantus Solostar U-100 Insulin) 100 unit/mL (3 mL) inpn inject 27 units subcutaneously every morning and 24 units subcutaneously AT DINNER (Patient taking differently: daily. inject 17 units once daily.)    glucose blood VI test strips (Glucocard Expression) strip Test Glucose tid for DM E11.9    OTHER 50 Each. Indications: Freestyle Light Test Strips    flash glucose scanning reader (FreeStyle Messi 14 Day [place]) misc Check Glucose qid for DM 1    ascorbic acid, vitamin C, (Vitamin C) 250 mg tablet Take  by mouth.  naproxen sodium (Aleve) 220 mg cap Take  by mouth as needed.  hydrOXYzine pamoate (VISTARIL) 25 mg capsule Take 25 mg by mouth three (3) times daily as needed. (Patient not taking: Reported on 10/18/2021)    spironolactone (ALDACTONE) 25 mg tablet Take  by mouth daily. (Patient not taking: Reported on 10/18/2021)    lisinopriL (PRINIVIL, ZESTRIL) 2.5 mg tablet Take 1 Tab by mouth daily. (Patient not taking: Reported on 11/11/2021)    loratadine (CLARITIN) 10 mg tablet Take 10 mg by mouth daily.  (Patient not taking: Reported on 11/11/2021)    multivitamin (ONE A DAY) tablet Take 1 Tab by mouth daily. (Patient not taking: Reported on 11/11/2021)     No current facility-administered medications on file prior to visit. CARDIOLOGY STUDIES TO DATE:  8/21 normal echo    Chief Complaint   Patient presents with    Chest Pain     \"pressure and nail + hammer down sternum. \"Onset 10/27. \"Tums is not working. \" \"Feels like I have knot in my upper abdomen\"    Coronary Artery Disease    Leg Swelling     Recently dx'd with foot fracture. Wearing a boot.  Hypertension    Cholesterol Problem     HPI :  She returns for follow-up of her coronary disease. [place] bypass [place] in [place] in 2019 and seeing Dr. Verito Stewart just once back in July. At that time she was complaining of musculoskeletal type chest pain which apparently she has had off and on ever since her surgery. Prior to her heart surgeries he tells me she really did not have any symptoms. For the last couple weeks this is gotten worse. It is sharp and positional.  It is not associated with any other symptoms and it does not sound in any way like angina. She has been eating late in the evening prior to going to bed because she has had some hypoglycemic episodes and now she is waking up at 2 or 3:00 in the morning with pressure in her upper chest which sounds like it might be GERD. For some reason she is no longer taking lisinopril or atorvastatin and she cannot tell me why. She does take an aspirin a day. She recently had some swelling in her left leg and foot and turned out she is got a fracture of the couple of bones and she did not know what because of the severe peripheral neuropathy. She is still smoking and gets no regular exercise.   CARDIAC ROS:   negative for dyspnea, palpitations, syncope, orthopnea, paroxysmal nocturnal dyspnea, exertional chest pressure/discomfort, claudication    Family History   Problem Relation Age of Onset    Diabetes Maternal Grandmother     Heart Disease Maternal Grandmother         pacemaker       Past Medical History:   Diagnosis Date    Allergies     CAD (coronary artery disease)     Depression 2013    [facility]      DM (diabetes mellitus), type 1, uncontrolled ([facility]     HTN (hypertension)     Hyperlipidemia     Neuropathy     Renal stone     S/P CABG (coronary artery bypass graft) 2019       GENERAL ROS:  A comprehensive review of systems was negative except for that written in the HPI. Visit Vitals  /78 (BP 1 Location: Right arm, BP Patient Position: Sitting, BP Cuff Size: Adult)   Pulse 72   Temp 97.2 °F (36.2 °C) (Temporal)   Resp 18   Ht 5' 8\" (1.727 m)   Wt 116 lb (52.6 kg)   LMP  (Approximate) Comment: \"last mestrual cycle 5 years ago and MD told me I have early stages of menopause. \"   SpO2 98% Comment: ra   BMI 17.64 kg/m²       Wt Readings from Last 3 Encounters:   11/11/21 116 lb (52.6 kg)   10/18/21 118 lb 2 oz (53.6 kg)   07/28/21 117 lb (53.1 kg)            BP Readings from Last 3 Encounters:   11/11/21 120/78   10/18/21 (!) 170/97   07/28/21 110/68       PHYSICAL EXAM  General appearance: alert, cooperative, no distress, appears stated age  Neurologic: Alert and oriented X 3  Neck: supple, symmetrical, trachea midline, no adenopathy, no carotid bruit and no JVD  Lungs: clear to auscultation bilaterally  Heart: regular rate and rhythm, S1, S2 normal, no murmur, click, rub or gallop  Extremities: extremities left leg walking cast    Lab Results   Component Value Date/Time    Cholesterol, total 175 02/11/2021 12:00 AM    HDL Cholesterol 55 02/11/2021 12:00 AM    LDL, calculated 96 02/11/2021 12:00 AM    Triglyceride 135 02/11/2021 12:00 AM     ASSESSMENT :      Her chest pains are quite atypical at this point.   I advised either low-dose nonsteroidals or Tylenol but given the fact that she did not have any real symptoms leading up to her bypass I think it is reasonable to rescreen her and risk stratify her with a Araceli Cardiolite stress test.  As far as her nocturnal symptoms I suggested to stop eating late and that she try some over-the-counter acid reducers for a while and see if that makes any difference. If it does not she can follow-up with her PCP. We will put her back on 80 mg of atorvastatin and to resume lisinopril 2.5 mg for renal protection. current treatment plan is effective, no change in therapy  lab results and schedule of future lab studies reviewed with patient  reviewed diet, exercise and weight control  very strongly urged to quit smoking to reduce cardiovascular risk    Encounter Diagnoses   Name Primary?  Other chest pain Yes    Coronary artery disease involving native coronary artery of native heart without angina pectoris     Primary hypertension     Mixed hyperlipidemia     S/P CABG (coronary artery bypass graft)      No orders of the defined types were placed in this encounter. Follow-up and Dispositions    · Return in about 3 months (around 2/11/2022). Dorothy Leonardo MD  11/11/2021  Please note that this dictation was completed with [employer], the computer voice recognition software. Quite often unanticipated grammatical, syntax, homophones, and other interpretive errors are inadvertently transcribed by the computer software. Please disregard these errors. Please excuse any errors that have escaped final proofreading. Thank you.

## 2021-11-11 NOTE — PROGRESS NOTES
Verbal order per Dr. Oneal Romero. Orders (medication, dose, route, frequency, amount, refills) repeated and verified twice. atorvastatin (LIPITOR) 80 mg tablet                   Take 1 Tablet by mouth daily. Indications: high cholesterol, Normal, Disp-30 Tablet, R-5                 lisinopriL (PRINIVIL, ZESTRIL) 2.5 mg tablet                  Take 1 Tablet by mouth daily.  Indications: high blood pressure, Normal, Disp-30 Tablet, R-5     NUCLEAR CARDIAC STRESS TEST                        Future, Expected: 11/15/2021, Expires: 11/11/2022, Routine, Reason for Exam: CAD, chest pressure  Stressing Agent: [employer]

## 2021-11-11 NOTE — PROGRESS NOTES
Identified pt with two pt identifiers(name and ). Reviewed record in preparation for visit and have obtained necessary documentation. Chief Complaint   Patient presents with    Chest Pain     \"pressure and nail + hammer down sternum. \"Onset 10/27. \"Tums is not working. \" \"Feels like I have knot in my upper abdomen\"    Coronary Artery Disease    Leg Swelling     Recently dx'd with foot fracture. Wearing a boot.  Hypertension    Cholesterol Problem      Vitals:    21 1302   BP: 120/78   Pulse: 72   Resp: 18   Temp: 97.2 °F (36.2 °C)   TempSrc: Temporal   SpO2: 98%   Weight: 116 lb (52.6 kg)   Height: 5' 8\" (1.727 m)   PainSc:   9       Medications reviewed/approved by Dr. Makayla Carvalho. Health Maintenance Review: Patient reminded of \"due or due soon\" health maintenance. I have asked the patient to contact his/her primary care provider (PCP) for follow-up on his/her health maintenance. Coordination of Care Questionnaire:  :   1) Have you been to an emergency room, urgent care, or hospitalized since your last visit? If yes, where when, and reason for visit? no       2. Have seen or consulted any other health care provider since your last visit? If yes, where when, and reason for visit? NO      Patient is accompanied by self I have received verbal consent from Jimmy Kaufman to discuss any/all medical information while they are present in the room.

## 2021-11-30 ENCOUNTER — TELEPHONE (OUTPATIENT)
Dept: FAMILY MEDICINE CLINIC | Age: 46
End: 2021-11-30

## 2021-11-30 NOTE — TELEPHONE ENCOUNTER
----- Message from Elzbieta Lujan sent at 11/30/2021  9:25 AM EST -----  Subject: Message to Provider    QUESTIONS  Information for Provider? Patient called to schedule a appt with her pcp   Dr. Bill Christianson. She called with symtoms of Vomiting and nausea all night   long, heart racing, high blood sugar 358-360 and fatigue for 2 days. I did   try to connect her to the nurse and the patient to hang up. I was able to   speak with Nurse Refugio Mcdonald and she advised the patient to go the [facility]. The patient did agree please advise.   ---------------------------------------------------------------------------  --------------  CALL BACK INFO  What is the best way for the office to contact you? OK to leave message on   voicemail  Preferred Call Back Phone Number? [phone]  ---------------------------------------------------------------------------  --------------  SCRIPT ANSWERS  Relationship to Patient? Third Party  Representative Name?  BONNIE Strickland

## 2021-12-01 NOTE — TELEPHONE ENCOUNTER
I have called and talked to this patient to see how she is feeling today. The patient reports that she feels much better today. For the last few nights she had been getting up in the middle of the night vomiting, yesterday she had vomited all night. She never ran a fever, her BS's where running high. She finally was able to get some beef broth in her and it stayed. She was able to get some sleep and woke up this morning feeling much better, her BS is 122. She missed her 2 week follow up for changing her Insulin dose in October. Please call this patient and make her an apt with Dr Wagner Lizarraga for a DM follow up with medication changes.

## 2021-12-06 ENCOUNTER — TELEPHONE (OUTPATIENT)
Dept: NON INVASIVE DIAGNOSTICS | Age: 46
End: 2021-12-06

## 2021-12-08 ENCOUNTER — HOSPITAL ENCOUNTER (OUTPATIENT)
Dept: NON INVASIVE DIAGNOSTICS | Age: 46
Discharge: HOME | End: 2021-12-08
Attending: SPECIALIST

## 2021-12-08 ENCOUNTER — HOSPITAL ENCOUNTER (OUTPATIENT)
Dept: NUCLEAR MEDICINE | Age: 46
Discharge: HOME | End: 2021-12-08
Attending: SPECIALIST

## 2021-12-08 DIAGNOSIS — I25.10 CORONARY ARTERY DISEASE INVOLVING NATIVE CORONARY ARTERY OF NATIVE HEART WITHOUT ANGINA PECTORIS: ICD-10-CM

## 2021-12-08 DIAGNOSIS — R07.89 OTHER CHEST PAIN: ICD-10-CM

## 2021-12-08 DIAGNOSIS — Z95.1 S/P CABG (CORONARY ARTERY BYPASS GRAFT): ICD-10-CM

## 2021-12-08 DIAGNOSIS — I10 PRIMARY HYPERTENSION: ICD-10-CM

## 2021-12-08 LAB — STRESS TARGET HR: 172 BPM

## 2021-12-08 RX ORDER — TETRAKIS(2-METHOXYISOBUTYLISOCYANIDE)COPPER(I) TETRAFLUOROBORATE 1 MG/ML
10 INJECTION, POWDER, LYOPHILIZED, FOR SOLUTION INTRAVENOUS
Status: DISCONTINUED | OUTPATIENT
Start: 2021-12-08 | End: 2021-12-08

## 2021-12-08 RX ORDER — TETRAKIS(2-METHOXYISOBUTYLISOCYANIDE)COPPER(I) TETRAFLUOROBORATE 1 MG/ML
10 INJECTION, POWDER, LYOPHILIZED, FOR SOLUTION INTRAVENOUS
Status: COMPLETED | OUTPATIENT
Start: 2021-12-08 | End: 2021-12-08

## 2021-12-08 RX ADMIN — TETRAKIS(2-METHOXYISOBUTYLISOCYANIDE)COPPER(I) TETRAFLUOROBORATE 10 MILLICURIE: 1 INJECTION, POWDER, LYOPHILIZED, FOR SOLUTION INTRAVENOUS at 07:15

## 2021-12-09 ENCOUNTER — TELEPHONE (OUTPATIENT)
Dept: FAMILY MEDICINE CLINIC | Age: 46
End: 2021-12-09

## 2021-12-09 DIAGNOSIS — E10.65 TYPE 1 DIABETES MELLITUS WITH HYPERGLYCEMIA (HCC): Primary | ICD-10-CM

## 2021-12-09 RX ORDER — BLOOD-GLUCOSE SENSOR
1 EACH MISCELLANEOUS DAILY
Qty: 10 EACH | Refills: 1 | Status: SHIPPED | OUTPATIENT
Start: 2021-12-09 | End: 2022-01-25

## 2021-12-09 NOTE — TELEPHONE ENCOUNTER
Freestyle Messi 2 Sensor not covered by [employer].    Covered formulary alternatives may include:  (PA Required)    Dexcom G6 Sensor  Allegra [place]  Freestyle [place] 14 Day [place]  Dexcom G6 Transmitter  Dexcom G6 Reciever  Dexcom G5 Mob/G4 Plat Sensor  Dexcom G5 Mobile Transmitter  Dexcom G5 Reciever Kit    Key: [id number]

## 2021-12-10 NOTE — TELEPHONE ENCOUNTER
I have called and talked to this patient. I advised her about the new RX for a Dexcom because her insurance denied the [place]. The patient tells me that her insurance did cover it and she only needs to have the sensor refill. I have asked them to check with the pharmacy and get more information and let me know if there is anything else they need us to do. Patient verbalizes understanding.

## 2021-12-21 ENCOUNTER — TELEPHONE (OUTPATIENT)
Dept: CARDIOLOGY CLINIC | Age: 46
End: 2021-12-21

## 2021-12-21 DIAGNOSIS — Z95.1 S/P CABG (CORONARY ARTERY BYPASS GRAFT): ICD-10-CM

## 2021-12-21 DIAGNOSIS — E78.2 MIXED HYPERLIPIDEMIA: ICD-10-CM

## 2021-12-21 DIAGNOSIS — I25.10 CORONARY ARTERY DISEASE INVOLVING NATIVE CORONARY ARTERY OF NATIVE HEART WITHOUT ANGINA PECTORIS: Primary | ICD-10-CM

## 2021-12-21 DIAGNOSIS — I10 PRIMARY HYPERTENSION: ICD-10-CM

## 2021-12-21 DIAGNOSIS — R07.89 OTHER CHEST PAIN: ICD-10-CM

## 2021-12-21 NOTE — TELEPHONE ENCOUNTER
Nurse from [facility] is requesting for an order to be sent from Dr. Jeanna Harrison for a heart study. Mentioned it was sent once but needed another one.  Can be reached at [phone]

## 2021-12-23 ENCOUNTER — APPOINTMENT (OUTPATIENT)
Dept: NON INVASIVE DIAGNOSTICS | Age: 46
End: 2021-12-23
Attending: INTERNAL MEDICINE
Payer: MEDICAID

## 2021-12-23 ENCOUNTER — HOSPITAL ENCOUNTER (OUTPATIENT)
Dept: NUCLEAR MEDICINE | Age: 46
Discharge: HOME | End: 2021-12-23
Attending: INTERNAL MEDICINE
Payer: MEDICAID

## 2021-12-23 ENCOUNTER — HOSPITAL ENCOUNTER (OUTPATIENT)
Dept: NON INVASIVE DIAGNOSTICS | Age: 46
Discharge: HOME | End: 2021-12-23
Attending: INTERNAL MEDICINE
Payer: MEDICAID

## 2021-12-23 DIAGNOSIS — Z95.1 S/P CABG (CORONARY ARTERY BYPASS GRAFT): ICD-10-CM

## 2021-12-23 DIAGNOSIS — I10 PRIMARY HYPERTENSION: ICD-10-CM

## 2021-12-23 DIAGNOSIS — I25.10 CORONARY ARTERY DISEASE INVOLVING NATIVE CORONARY ARTERY OF NATIVE HEART WITHOUT ANGINA PECTORIS: ICD-10-CM

## 2021-12-23 DIAGNOSIS — R07.89 OTHER CHEST PAIN: ICD-10-CM

## 2021-12-23 DIAGNOSIS — E78.2 MIXED HYPERLIPIDEMIA: ICD-10-CM

## 2021-12-23 LAB
STRESS BASELINE DIAS BP: 88 MMHG
STRESS BASELINE HR: 76 BPM
STRESS BASELINE ST DEPRESSION: 0 MM
STRESS BASELINE SYS BP: 136 MMHG
STRESS ESTIMATED WORKLOAD: 1 METS
STRESS EXERCISE DUR MIN: NORMAL
STRESS O2 SAT PEAK: 99 %
STRESS O2 SAT REST: 99 %
STRESS PEAK DIAS BP: 91 MMHG
STRESS PEAK SYS BP: 142 MMHG
STRESS PERCENT HR ACHIEVED: 59 %
STRESS POST PEAK HR: 101 BPM
STRESS RATE PRESSURE PRODUCT: NORMAL BPM*MMHG
STRESS ST DEPRESSION: 0 MM
STRESS TARGET HR: 172 BPM

## 2021-12-23 PROCEDURE — 78452 HT MUSCLE IMAGE SPECT MULT: CPT

## 2021-12-23 PROCEDURE — A9500 TC99M SESTAMIBI: HCPCS

## 2021-12-23 PROCEDURE — 74011250636 HC RX REV CODE- 250/636: Performed by: INTERNAL MEDICINE

## 2021-12-23 RX ORDER — TETRAKIS(2-METHOXYISOBUTYLISOCYANIDE)COPPER(I) TETRAFLUOROBORATE 1 MG/ML
10 INJECTION, POWDER, LYOPHILIZED, FOR SOLUTION INTRAVENOUS
Status: COMPLETED | OUTPATIENT
Start: 2021-12-23 | End: 2021-12-23

## 2021-12-23 RX ORDER — TETRAKIS(2-METHOXYISOBUTYLISOCYANIDE)COPPER(I) TETRAFLUOROBORATE 1 MG/ML
30 INJECTION, POWDER, LYOPHILIZED, FOR SOLUTION INTRAVENOUS
Status: COMPLETED | OUTPATIENT
Start: 2021-12-23 | End: 2021-12-23

## 2021-12-23 RX ADMIN — TETRAKIS(2-METHOXYISOBUTYLISOCYANIDE)COPPER(I) TETRAFLUOROBORATE 10 MILLICURIE: 1 INJECTION, POWDER, LYOPHILIZED, FOR SOLUTION INTRAVENOUS at 10:45

## 2021-12-23 RX ADMIN — TETRAKIS(2-METHOXYISOBUTYLISOCYANIDE)COPPER(I) TETRAFLUOROBORATE 30 MILLICURIE: 1 INJECTION, POWDER, LYOPHILIZED, FOR SOLUTION INTRAVENOUS at 12:00

## 2021-12-23 RX ADMIN — REGADENOSON 0.4 MG: 0.08 INJECTION, SOLUTION INTRAVENOUS at 12:00

## 2022-01-08 DIAGNOSIS — E10.65 TYPE 1 DIABETES MELLITUS WITH HYPERGLYCEMIA (HCC): ICD-10-CM

## 2022-01-08 RX ORDER — FLASH GLUCOSE SENSOR
KIT MISCELLANEOUS
Qty: 5 KIT | Refills: 0 | Status: SHIPPED | OUTPATIENT
Start: 2022-01-08 | End: 2022-03-10 | Stop reason: SDUPTHER

## 2022-01-17 ENCOUNTER — TELEPHONE (OUTPATIENT)
Dept: CARDIOLOGY CLINIC | Age: 47
End: 2022-01-17

## 2022-01-17 NOTE — TELEPHONE ENCOUNTER
----- Message from Amber Hoff LPN sent at 7/26/6425  3:53 PM EST -----  Regarding: FW: stress nuclear scan    ----- Message -----  From: Jaime Joaquin MD  Sent: 12/27/2021  10:04 AM EST  To: Amber Hoff LPN  Subject: stress nuclear scan                              Advise stress nuclear normal--no blockages or ischemia. RTC 6 mos, sooner prn. Thanks [facility].

## 2022-01-17 NOTE — TELEPHONE ENCOUNTER
Spoke with the patient. Verified patient with two patient identifiers. Results given and questions answered. Patient verbalized understanding. Patient cancelled Feb Appt be to it being to soon, Patient was put on list to schedule in 6 months.

## 2022-01-25 ENCOUNTER — OFFICE VISIT (OUTPATIENT)
Dept: FAMILY MEDICINE CLINIC | Age: 47
End: 2022-01-25
Payer: MEDICAID

## 2022-01-25 VITALS
DIASTOLIC BLOOD PRESSURE: 84 MMHG | BODY MASS INDEX: 16.18 KG/M2 | TEMPERATURE: 97.2 F | WEIGHT: 113 LBS | RESPIRATION RATE: 14 BRPM | HEART RATE: 110 BPM | HEIGHT: 70 IN | SYSTOLIC BLOOD PRESSURE: 128 MMHG | OXYGEN SATURATION: 96 %

## 2022-01-25 DIAGNOSIS — R80.9 MICROPROTEINURIA: ICD-10-CM

## 2022-01-25 DIAGNOSIS — R80.9 MICROALBUMINURIA: ICD-10-CM

## 2022-01-25 DIAGNOSIS — F32.A DEPRESSION, UNSPECIFIED DEPRESSION TYPE: ICD-10-CM

## 2022-01-25 DIAGNOSIS — E78.5 HYPERLIPIDEMIA, UNSPECIFIED HYPERLIPIDEMIA TYPE: ICD-10-CM

## 2022-01-25 DIAGNOSIS — H69.82 EUSTACHIAN TUBE DYSFUNCTION, LEFT: ICD-10-CM

## 2022-01-25 DIAGNOSIS — E10.65 TYPE 1 DIABETES MELLITUS WITH HYPERGLYCEMIA (HCC): Primary | ICD-10-CM

## 2022-01-25 PROCEDURE — 99214 OFFICE O/P EST MOD 30 MIN: CPT | Performed by: FAMILY MEDICINE

## 2022-01-25 RX ORDER — DULOXETIN HYDROCHLORIDE 60 MG/1
60 CAPSULE, DELAYED RELEASE ORAL DAILY
COMMUNITY
Start: 2021-12-23 | End: 2022-01-25 | Stop reason: SDUPTHER

## 2022-01-25 RX ORDER — INSULIN GLARGINE 100 [IU]/ML
INJECTION, SOLUTION SUBCUTANEOUS
Qty: 15 ML | Refills: 3 | Status: SHIPPED | OUTPATIENT
Start: 2022-01-25

## 2022-01-25 RX ORDER — BLOOD-GLUCOSE CONTROL, NORMAL
EACH MISCELLANEOUS
Qty: 100 STRIP | Refills: 5 | Status: SHIPPED | OUTPATIENT
Start: 2022-01-25

## 2022-01-25 RX ORDER — DULOXETIN HYDROCHLORIDE 60 MG/1
60 CAPSULE, DELAYED RELEASE ORAL DAILY
Qty: 90 CAPSULE | Refills: 1 | Status: SHIPPED | OUTPATIENT
Start: 2022-01-25

## 2022-01-25 RX ORDER — INSULIN LISPRO 100 [IU]/ML
INJECTION, SOLUTION INTRAVENOUS; SUBCUTANEOUS
Qty: 15 ML | Refills: 1 | Status: SHIPPED | OUTPATIENT
Start: 2022-01-25

## 2022-01-25 RX ORDER — ATORVASTATIN CALCIUM 80 MG/1
80 TABLET, FILM COATED ORAL DAILY
Qty: 90 TABLET | Refills: 1 | Status: SHIPPED | OUTPATIENT
Start: 2022-01-25

## 2022-01-25 RX ORDER — LISINOPRIL 2.5 MG/1
2.5 TABLET ORAL DAILY
Qty: 90 TABLET | Refills: 1 | Status: SHIPPED | OUTPATIENT
Start: 2022-01-25

## 2022-01-25 RX ORDER — FLUTICASONE PROPIONATE 50 MCG
2 SPRAY, SUSPENSION (ML) NASAL DAILY
Qty: 1 EACH | Refills: 1 | Status: SHIPPED | OUTPATIENT
Start: 2022-01-25 | End: 2022-03-22

## 2022-01-25 NOTE — PROGRESS NOTES
Bernice Jacobs is a 48 y.o. female who presents to the office today with the following:  Chief Complaint   Patient presents with    Diabetes    Depression       HPI  DM  Last HbA1C 8.1  -180 in am  After a meal 112-135  On Lantus 17 U at noon  And Humalog 4 U before meals  occ gets craving of sweets  Saw Endocrin once only    Depression  Pt on Cymbalta    Hyperlipidemia  Due for recheck    No taking Lisinopril    CAD  Sees Dr Oswaldo Spencer  Had stress test last mo and normal      Review of Systems   HENT: Positive for ear pain (left). Respiratory: Negative for cough and shortness of breath. Cardiovascular: Positive for chest pain (pressure from Bypass). See HPI. Past Medical History:   Diagnosis Date    Allergies     CAD (coronary artery disease)     Depression 2013    [facility]      DM (diabetes mellitus), type 1, uncontrolled ([facility]     HTN (hypertension)     Hyperlipidemia     Neuropathy     Renal stone     S/P CABG (coronary artery bypass graft) 2019       Past Surgical History:   Procedure Laterality Date    HX APPENDECTOMY      ruptured    HX CORONARY ARTERY BYPASS GRAFT  10/2019       No Known Allergies    Current Outpatient Medications   Medication Sig    atorvastatin (LIPITOR) 80 mg tablet Take 1 Tablet by mouth daily. Indications: high cholesterol    insulin glargine (Lantus Solostar U-100 Insulin) 100 unit/mL (3 mL) inpn inject 17 units once daily.  lisinopriL (PRINIVIL, ZESTRIL) 2.5 mg tablet Take 1 Tablet by mouth daily.  insulin lispro (HUMALOG) 100 unit/mL kwikpen Inject 4 U sc prior to meals    glucose blood VI test strips (Glucocard Expression) strip Test Glucose tid for DM E11.9    DULoxetine (CYMBALTA) 60 mg capsule Take 1 Capsule by mouth daily.  fluticasone propionate (FLONASE) 50 mcg/actuation nasal spray 2 Sprays by Both Nostrils route daily.     FreeStyle Messi 2 Sensor kit use as directed for 14 days    clotrimazole (LOTRIMIN) 1 % topical cream Apply  to affected area two (2) times a day.  OTHER 50 Each. Indications: Freestyle Light Test Strips    hydrOXYzine pamoate (VISTARIL) 25 mg capsule Take 25 mg by mouth three (3) times daily as needed.  flash glucose scanning reader (FreeStyle Messi 14 Day [place]) misc Check Glucose qid for DM 1    spironolactone (ALDACTONE) 25 mg tablet Take  by mouth daily.  ascorbic acid, vitamin C, (Vitamin C) 250 mg tablet Take 250 mg by mouth daily.  naproxen sodium (Aleve) 220 mg cap Take  by mouth as needed.  multivitamin (ONE A DAY) tablet Take 1 Tablet by mouth daily. No current facility-administered medications for this visit. Social History     Socioeconomic History    Marital status: SINGLE   Tobacco Use    Smoking status: Current Every Day Smoker     Packs/day: 0.50     Types: Cigarettes    Smokeless tobacco: Never Used   Vaping Use    Vaping Use: Some days    Substances: CBD, Flavoring    Devices: Disposable   Substance and Sexual Activity    Alcohol use: Never    Drug use: Yes     Types: Marijuana     Comment: for appetite       Family History   Problem Relation Age of Onset    Diabetes Maternal Grandmother     Heart Disease Maternal Grandmother         pacemaker         Physical Exam:  Visit Vitals  /84   Pulse (!) 110   Temp 97.2 °F (36.2 °C)   Resp 14   Ht 5' 10\" (1.778 m)   Wt 113 lb (51.3 kg)   SpO2 96%   BMI 16.21 kg/m²     Physical Exam  Vitals and nursing note reviewed. HENT:      Head: Normocephalic and atraumatic. Right Ear: Tympanic membrane, ear canal and external ear normal.      Left Ear: Tympanic membrane, ear canal and external ear normal.   Eyes:      Extraocular Movements: Extraocular movements intact. Conjunctiva/sclera: Conjunctivae normal.   Cardiovascular:      Rate and Rhythm: Normal rate and regular rhythm. Heart sounds: Normal heart sounds. Pulmonary:      Effort: Pulmonary effort is normal.      Breath sounds: Normal breath sounds. Musculoskeletal:      Right lower leg: No edema. Left lower leg: No edema. Lymphadenopathy:      Cervical: No cervical adenopathy. Skin:     General: Skin is warm and dry. Neurological:      Mental Status: She is alert and oriented to person, place, and time. Psychiatric:         Mood and Affect: Mood normal.         Behavior: Behavior normal.         Assessment/Plan:    ICD-10-CM ICD-9-CM    1. Type 1 diabetes mellitus with hyperglycemia ([facility])  E10.65 250.01 HEMOGLOBIN A1C WITH EAG      CBC WITH AUTOMATED DIFF      HEMOGLOBIN A1C WITH EAG      CBC WITH AUTOMATED DIFF      insulin glargine (Lantus Solostar U-100 Insulin) 100 unit/mL (3 mL) inpn      insulin lispro (HUMALOG) 100 unit/mL kwikpen      glucose blood VI test strips (Glucocard Expression) strip   2. Microalbuminuria  R80.9 791.0 MICROALBUMIN, UR, RAND W/ MICROALB/CREAT RATIO      MICROALBUMIN, UR, RAND W/ MICROALB/CREAT RATIO   3. Hyperlipidemia, unspecified hyperlipidemia type  E78.5 272.4 LIPID PANEL      METABOLIC PANEL, COMPREHENSIVE      LIPID PANEL      METABOLIC PANEL, COMPREHENSIVE   4. Microproteinuria  R80.9 791.0 lisinopriL (PRINIVIL, ZESTRIL) 2.5 mg tablet   5. Depression, unspecified depression type  F32. A 311 DULoxetine (CYMBALTA) 60 mg capsule   6.  Eustachian tube dysfunction, left  H69.82 381.81 fluticasone propionate (FLONASE) 50 mcg/actuation nasal spray           Temo Castellano MD

## 2022-01-25 NOTE — PROGRESS NOTES
1. Have you been to the ER, urgent care clinic since your last visit? Hospitalized since your last visit? No    2. Have you seen or consulted any other health care providers outside of the [address] [place] [address] since your last visit? Include any pap smears or colon screening.  No

## 2022-01-28 LAB
ALBUMIN SERPL-MCNC: 4 G/DL (ref 3.8–4.8)
ALBUMIN/CREAT UR: 283 MG/G CREAT (ref 0–29)
ALBUMIN/GLOB SERPL: 1.7 {RATIO} (ref 1.2–2.2)
ALP SERPL-CCNC: 123 IU/L (ref 44–121)
ALT SERPL-CCNC: 30 IU/L (ref 0–32)
AST SERPL-CCNC: 30 IU/L (ref 0–40)
BASOPHILS # BLD AUTO: 0.1 X10E3/UL (ref 0–0.2)
BASOPHILS NFR BLD AUTO: 1 %
BILIRUB SERPL-MCNC: <0.2 MG/DL (ref 0–1.2)
BUN SERPL-MCNC: 13 MG/DL (ref 6–24)
BUN/CREAT SERPL: 13 (ref 9–23)
CALCIUM SERPL-MCNC: 9.5 MG/DL (ref 8.7–10.2)
CHLORIDE SERPL-SCNC: 99 MMOL/L (ref 96–106)
CHOLEST SERPL-MCNC: 158 MG/DL (ref 100–199)
CO2 SERPL-SCNC: 25 MMOL/L (ref 20–29)
CREAT SERPL-MCNC: 1 MG/DL (ref 0.57–1)
CREAT UR-MCNC: 230.5 MG/DL
EOSINOPHIL # BLD AUTO: 0.3 X10E3/UL (ref 0–0.4)
EOSINOPHIL NFR BLD AUTO: 3 %
ERYTHROCYTE [DISTWIDTH] IN BLOOD BY AUTOMATED COUNT: 12.5 % (ref 11.7–15.4)
EST. AVERAGE GLUCOSE BLD GHB EST-MCNC: 283 MG/DL
GLOBULIN SER CALC-MCNC: 2.3 G/DL (ref 1.5–4.5)
GLUCOSE SERPL-MCNC: 287 MG/DL (ref 65–99)
HBA1C MFR BLD: 11.5 % (ref 4.8–5.6)
HCT VFR BLD AUTO: 45 % (ref 34–46.6)
HDLC SERPL-MCNC: 69 MG/DL
HGB BLD-MCNC: 14.5 G/DL (ref 11.1–15.9)
IMM GRANULOCYTES # BLD AUTO: 0 X10E3/UL (ref 0–0.1)
IMM GRANULOCYTES NFR BLD AUTO: 0 %
IMP & REVIEW OF LAB RESULTS: NORMAL
LDLC SERPL CALC-MCNC: 66 MG/DL (ref 0–99)
LYMPHOCYTES # BLD AUTO: 3.4 X10E3/UL (ref 0.7–3.1)
LYMPHOCYTES NFR BLD AUTO: 38 %
MCH RBC QN AUTO: 29.9 PG (ref 26.6–33)
MCHC RBC AUTO-ENTMCNC: 32.2 G/DL (ref 31.5–35.7)
MCV RBC AUTO: 93 FL (ref 79–97)
MICROALBUMIN UR-MCNC: 652.9 UG/ML
MONOCYTES # BLD AUTO: 0.4 X10E3/UL (ref 0.1–0.9)
MONOCYTES NFR BLD AUTO: 4 %
MORPHOLOGY BLD-IMP: ABNORMAL
NEUTROPHILS # BLD AUTO: 4.8 X10E3/UL (ref 1.4–7)
NEUTROPHILS NFR BLD AUTO: 54 %
PLATELET # BLD AUTO: 241 X10E3/UL (ref 150–450)
POTASSIUM SERPL-SCNC: 4.4 MMOL/L (ref 3.5–5.2)
PROT SERPL-MCNC: 6.3 G/DL (ref 6–8.5)
RBC # BLD AUTO: 4.85 X10E6/UL (ref 3.77–5.28)
SODIUM SERPL-SCNC: 137 MMOL/L (ref 134–144)
TRIGL SERPL-MCNC: 135 MG/DL (ref 0–149)
VLDLC SERPL CALC-MCNC: 23 MG/DL (ref 5–40)
WBC # BLD AUTO: 8.9 X10E3/UL (ref 3.4–10.8)

## 2022-01-31 NOTE — PROGRESS NOTES
Call pt, the Chol is good  The Urine test is showing Protein leakage, cont Lisinopril  The electrolytes, kidney and liver tests are normal  The HbA1C is 11.5, very high again  The CBC is ok    Monitor your Glucose four times a day  Fasting in am and 2 h after meals and RTC in 2 w with your diary for further adjustment of the Insulin

## 2022-02-02 NOTE — PROGRESS NOTES
I have called and talked to this patient. I have given her the lab results and recommendations from Dr Kamar Hamilton. Patient verbalizes understanding. Please call the patient at [phone] and make her a 2 week apt with Dr Kamar Hamilton for a DM follow up and medication adjustment. Patient was advised to check BS 4 Xdaily and keep diary to bring to the apt.

## 2022-02-15 ENCOUNTER — TELEPHONE (OUTPATIENT)
Dept: FAMILY MEDICINE CLINIC | Age: 47
End: 2022-02-15

## 2022-02-15 DIAGNOSIS — E10.65 TYPE 1 DIABETES MELLITUS WITH HYPERGLYCEMIA (HCC): Primary | ICD-10-CM

## 2022-02-15 NOTE — TELEPHONE ENCOUNTER
Patient asking for referral to endocrinologist. Preferably someone in [place] or [address] [place] [address].

## 2022-02-17 ENCOUNTER — OFFICE VISIT (OUTPATIENT)
Dept: FAMILY MEDICINE CLINIC | Age: 47
End: 2022-02-17
Payer: MEDICAID

## 2022-02-17 VITALS
WEIGHT: 111 LBS | HEART RATE: 113 BPM | TEMPERATURE: 97 F | DIASTOLIC BLOOD PRESSURE: 81 MMHG | RESPIRATION RATE: 14 BRPM | HEIGHT: 69 IN | OXYGEN SATURATION: 95 % | BODY MASS INDEX: 16.44 KG/M2 | SYSTOLIC BLOOD PRESSURE: 119 MMHG

## 2022-02-17 DIAGNOSIS — M79.672 LEFT FOOT PAIN: ICD-10-CM

## 2022-02-17 DIAGNOSIS — F32.A DEPRESSION, UNSPECIFIED DEPRESSION TYPE: ICD-10-CM

## 2022-02-17 DIAGNOSIS — E10.65 TYPE 1 DIABETES MELLITUS WITH HYPERGLYCEMIA (HCC): Primary | ICD-10-CM

## 2022-02-17 DIAGNOSIS — L98.9 SKIN LESION OF NECK: ICD-10-CM

## 2022-02-17 PROCEDURE — 99214 OFFICE O/P EST MOD 30 MIN: CPT | Performed by: FAMILY MEDICINE

## 2022-02-17 RX ORDER — TIMOLOL MALEATE 5 MG/ML
SOLUTION/ DROPS OPHTHALMIC
Qty: 10 ML | Refills: 0 | Status: SHIPPED | OUTPATIENT
Start: 2022-02-17

## 2022-02-17 NOTE — PROGRESS NOTES
Amrit Jones is a 48 y.o. female who presents to the office today with the following:  Chief Complaint   Patient presents with    Diabetes     fo[llow up       HPI  DM  Recent HbA1C 11.5  On Lantus 17 U  And Lispro 4 U prior to meals, occ 3 U  Am Glucose  in am  2 h after meal 121-190  At lunch 128-210  At dinner   At bedtime     Depression  Pt stressed but denies being suicidal  Can't sleep at night d/t foot pain  Prev on Xanax  Wants to see Aaron Dixon in [place] but needs a referral  Never hurt herself in the past  On Duloxetine 60, not helping enough      Foot pain from cracked callous  Sees Podiatrist in [place]  Tried Vaseline with occlusion at night  Has MRI scheduled    Weight loss  Eats veggies, fish, baked sweet potatoes  Chicken, hamburger, little pork, eggs  Drinking high protein shake in am in between breakfast and lunch    Breaking out on neck and has lumps occ    Review of Systems   Constitutional: Positive for weight loss. Psychiatric/Behavioral: Positive for depression. Negative for hallucinations, substance abuse and suicidal ideas. The patient has insomnia. The patient is not nervous/anxious. See HPI. Past Medical History:   Diagnosis Date    Allergies     CAD (coronary artery disease)     Depression 2013    [facility]      DM (diabetes mellitus), type 1, uncontrolled ([facility] [address]     HTN (hypertension)     Hyperlipidemia     Neuropathy     Renal stone     S/P CABG (coronary artery bypass graft) 2019       Past Surgical History:   Procedure Laterality Date    HX APPENDECTOMY      ruptured    HX CORONARY ARTERY BYPASS GRAFT  10/2019       No Known Allergies    Current Outpatient Medications   Medication Sig    timolol (TIMOPTIC) 0.5 % ophthalmic solution Use one drop bid    atorvastatin (LIPITOR) 80 mg tablet Take 1 Tablet by mouth daily.  Indications: high cholesterol    insulin glargine (Lantus Solostar U-100 Insulin) 100 unit/mL (3 mL) inpn inject 17 units once daily.  lisinopriL (PRINIVIL, ZESTRIL) 2.5 mg tablet Take 1 Tablet by mouth daily.  insulin lispro (HUMALOG) 100 unit/mL kwikpen Inject 4 U sc prior to meals    glucose blood VI test strips (Glucocard Expression) strip Test Glucose tid for DM E11.9    DULoxetine (CYMBALTA) 60 mg capsule Take 1 Capsule by mouth daily.  fluticasone propionate (FLONASE) 50 mcg/actuation nasal spray 2 Sprays by Both Nostrils route daily.  FreeStyle Messi 2 Sensor kit use as directed for 14 days    OTHER 50 Each. Indications: Freestyle Light Test Strips    hydrOXYzine pamoate (VISTARIL) 25 mg capsule Take 25 mg by mouth three (3) times daily as needed.  flash glucose scanning reader (FreeStyle Messi 14 Day [place]) misc Check Glucose qid for DM 1    spironolactone (ALDACTONE) 25 mg tablet Take  by mouth daily.  ascorbic acid, vitamin C, (Vitamin C) 250 mg tablet Take 250 mg by mouth daily.  naproxen sodium (Aleve) 220 mg cap Take  by mouth as needed.  multivitamin (ONE A DAY) tablet Take 1 Tablet by mouth daily. No current facility-administered medications for this visit.        Social History     Socioeconomic History    Marital status: SINGLE   Tobacco Use    Smoking status: Current Every Day Smoker     Packs/day: 0.50     Types: Cigarettes    Smokeless tobacco: Never Used   Vaping Use    Vaping Use: Some days    Substances: CBD, Flavoring    Devices: Disposable   Substance and Sexual Activity    Alcohol use: Never    Drug use: Yes     Types: Marijuana     Comment: for appetite       Family History   Problem Relation Age of Onset    Diabetes Maternal Grandmother     Heart Disease Maternal Grandmother         pacemaker         Physical Exam:  Visit Vitals  /81 (BP 1 Location: Left upper arm, BP Patient Position: At rest)   Pulse (!) 113   Temp 97 °F (36.1 °C)   Resp 14   Ht 5' 9\" (1.753 m)   Wt 111 lb (50.3 kg)   SpO2 95%   BMI 16.39 kg/m²     Physical Exam  Vitals and nursing note reviewed. Constitutional:       General: She is not in acute distress. HENT:      Head: Normocephalic and atraumatic. Eyes:      Conjunctiva/sclera: Conjunctivae normal.   Pulmonary:      Effort: Pulmonary effort is normal.   Musculoskeletal:      Right lower leg: No edema. Left lower leg: No edema. Skin:     Findings: Lesion present. Comments: Skin lesion right back of neck, with swollen gland about 1 cm, left foot with callous lesion laterally and with cracked skin   Neurological:      Mental Status: She is alert and oriented to person, place, and time. Psychiatric:         Mood and Affect: Mood normal.         Behavior: Behavior normal.         Assessment/Plan:    ICD-10-CM ICD-9-CM    1. Type 1 diabetes mellitus with hyperglycemia (HCC)  E10.65 250.01 Cont current meds and eat high protein meals and a referral to Endocrin done a day ago   2. Depression, unspecified depression type  F32. A 311 REFERRAL TO PSYCHIATRY  Cont Cymbalta   3. Left foot pain  M79.672 729.5 Advised to try 1 Timolol drop bid  And F/U with Podiatrist   4. Skin lesion right neck and swollen gland - avoid scratching    Follow-up and Dispositions    · Return in about 2 weeks (around 3/3/2022).          Wade Juan MD

## 2022-02-17 NOTE — PROGRESS NOTES
1. \"Have you been to the ER, urgent care clinic since your last visit? Hospitalized since your last visit? \" No    2. \"Have you seen or consulted any other health care providers outside of the [address] [place] [address] since your last visit? \" No     3. For patients aged 39-70: Has the patient had a colonoscopy / FIT/ Cologuard  No      If the patient is female:    4. For patients aged 41-77: Has the patient had a mammogram within the past 2 years? No      5. For patients aged 21-65: Has the patient had a pap smear?  NA - based on age or sex

## 2022-03-10 DIAGNOSIS — E10.65 TYPE 1 DIABETES MELLITUS WITH HYPERGLYCEMIA (HCC): ICD-10-CM

## 2022-03-10 RX ORDER — FLASH GLUCOSE SENSOR
KIT MISCELLANEOUS
Qty: 5 KIT | Refills: 1 | Status: SHIPPED | OUTPATIENT
Start: 2022-03-10

## 2022-03-19 PROBLEM — Z95.1 S/P CABG (CORONARY ARTERY BYPASS GRAFT): Status: ACTIVE | Noted: 2019-01-01

## 2022-03-22 DIAGNOSIS — H69.82 EUSTACHIAN TUBE DYSFUNCTION, LEFT: ICD-10-CM

## 2022-03-22 RX ORDER — FLUTICASONE PROPIONATE 50 MCG
SPRAY, SUSPENSION (ML) NASAL
Qty: 16 G | Refills: 2 | Status: SHIPPED | OUTPATIENT
Start: 2022-03-22

## 2023-05-17 RX ORDER — TIMOLOL MALEATE 5 MG/ML
SOLUTION/ DROPS OPHTHALMIC
COMMUNITY
Start: 2022-02-17

## 2023-05-17 RX ORDER — FLUTICASONE PROPIONATE 50 MCG
2 SPRAY, SUSPENSION (ML) NASAL DAILY
COMMUNITY
Start: 2022-03-22

## 2023-05-17 RX ORDER — LISINOPRIL 2.5 MG/1
2.5 TABLET ORAL DAILY
COMMUNITY
Start: 2022-01-25

## 2023-05-17 RX ORDER — INSULIN GLARGINE 100 [IU]/ML
INJECTION, SOLUTION SUBCUTANEOUS
COMMUNITY
Start: 2022-01-25

## 2023-05-17 RX ORDER — ATORVASTATIN CALCIUM 80 MG/1
80 TABLET, FILM COATED ORAL DAILY
COMMUNITY
Start: 2022-01-25

## 2023-05-17 RX ORDER — COVID-19 ANTIGEN TEST
KIT MISCELLANEOUS PRN
COMMUNITY

## 2023-05-17 RX ORDER — SPIRONOLACTONE 25 MG/1
TABLET ORAL DAILY
COMMUNITY

## 2023-05-17 RX ORDER — HYDROXYZINE PAMOATE 25 MG/1
25 CAPSULE ORAL 3 TIMES DAILY PRN
COMMUNITY

## 2023-05-17 RX ORDER — DULOXETIN HYDROCHLORIDE 60 MG/1
60 CAPSULE, DELAYED RELEASE ORAL DAILY
COMMUNITY
Start: 2022-01-25

## 2023-05-17 RX ORDER — ASCORBIC ACID 250 MG
250 TABLET ORAL DAILY
COMMUNITY

## 2023-07-26 NOTE — PROGRESS NOTES
Spoke with patient after verifying Name, and , informed patient of lab results and recommendations per Dr. Kavon Connolly . Patient given an opportunity to ask questions, repeated information, and verbalized understanding. none